# Patient Record
Sex: FEMALE | Race: BLACK OR AFRICAN AMERICAN | NOT HISPANIC OR LATINO | Employment: UNEMPLOYED | URBAN - METROPOLITAN AREA
[De-identification: names, ages, dates, MRNs, and addresses within clinical notes are randomized per-mention and may not be internally consistent; named-entity substitution may affect disease eponyms.]

---

## 2018-04-04 ENCOUNTER — TELEPHONE (OUTPATIENT)
Dept: ADMINISTRATIVE | Facility: HOSPITAL | Age: 83
End: 2018-04-04

## 2021-05-06 ENCOUNTER — PATIENT MESSAGE (OUTPATIENT)
Dept: RESEARCH | Facility: HOSPITAL | Age: 86
End: 2021-05-06

## 2021-07-01 ENCOUNTER — PATIENT MESSAGE (OUTPATIENT)
Dept: ADMINISTRATIVE | Facility: OTHER | Age: 86
End: 2021-07-01

## 2022-02-10 ENCOUNTER — PATIENT MESSAGE (OUTPATIENT)
Dept: ADMINISTRATIVE | Facility: HOSPITAL | Age: 87
End: 2022-02-10

## 2025-05-15 ENCOUNTER — HOSPITAL ENCOUNTER (INPATIENT)
Facility: HOSPITAL | Age: 89
LOS: 5 days | Discharge: HOME | DRG: 690 | End: 2025-05-21
Attending: EMERGENCY MEDICINE | Admitting: INTERNAL MEDICINE
Payer: MEDICARE

## 2025-05-15 DIAGNOSIS — N39.0 URINARY TRACT INFECTION WITHOUT HEMATURIA, SITE UNSPECIFIED: Primary | ICD-10-CM

## 2025-05-15 DIAGNOSIS — M81.0 AGE-RELATED OSTEOPOROSIS WITHOUT CURRENT PATHOLOGICAL FRACTURE: ICD-10-CM

## 2025-05-15 DIAGNOSIS — R41.82 AMS (ALTERED MENTAL STATUS): ICD-10-CM

## 2025-05-15 DIAGNOSIS — G20.B2 PARKINSON'S DISEASE WITH DYSKINESIA AND FLUCTUATING MANIFESTATIONS: ICD-10-CM

## 2025-05-15 DIAGNOSIS — R41.82 ALTERED MENTAL STATUS, UNSPECIFIED ALTERED MENTAL STATUS TYPE: ICD-10-CM

## 2025-05-15 DIAGNOSIS — R07.9 CHEST PAIN: ICD-10-CM

## 2025-05-15 LAB
ABSOLUTE EOSINOPHIL (OHS): 0.03 K/UL
ABSOLUTE MONOCYTE (OHS): 0.34 K/UL (ref 0.3–1)
ABSOLUTE NEUTROPHIL COUNT (OHS): 3.08 K/UL (ref 1.8–7.7)
ALBUMIN SERPL BCP-MCNC: 3.5 G/DL (ref 3.5–5.2)
ALP SERPL-CCNC: 66 UNIT/L (ref 40–150)
ALT SERPL W/O P-5'-P-CCNC: 15 UNIT/L (ref 10–44)
ANION GAP (OHS): 6 MMOL/L (ref 8–16)
AST SERPL-CCNC: 20 UNIT/L (ref 11–45)
BACTERIA #/AREA URNS AUTO: ABNORMAL /HPF
BASOPHILS # BLD AUTO: 0.01 K/UL
BASOPHILS NFR BLD AUTO: 0.2 %
BILIRUB SERPL-MCNC: 0.3 MG/DL (ref 0.1–1)
BILIRUB UR QL STRIP.AUTO: NEGATIVE
BNP SERPL-MCNC: 41 PG/ML (ref 0–99)
BUN SERPL-MCNC: 18 MG/DL (ref 8–23)
CALCIUM SERPL-MCNC: 10 MG/DL (ref 8.7–10.5)
CHLORIDE SERPL-SCNC: 105 MMOL/L (ref 95–110)
CLARITY UR: ABNORMAL
CO2 SERPL-SCNC: 32 MMOL/L (ref 23–29)
COLOR UR AUTO: YELLOW
CREAT SERPL-MCNC: 0.7 MG/DL (ref 0.5–1.4)
ERYTHROCYTE [DISTWIDTH] IN BLOOD BY AUTOMATED COUNT: 14.7 % (ref 11.5–14.5)
GFR SERPLBLD CREATININE-BSD FMLA CKD-EPI: >60 ML/MIN/1.73/M2
GLUCOSE SERPL-MCNC: 202 MG/DL (ref 70–110)
GLUCOSE UR QL STRIP: NEGATIVE
HCT VFR BLD AUTO: 36.8 % (ref 37–48.5)
HGB BLD-MCNC: 11.8 GM/DL (ref 12–16)
HGB UR QL STRIP: ABNORMAL
HOLD SPECIMEN: NORMAL
HYALINE CASTS UR QL AUTO: 0 /LPF (ref 0–1)
IMM GRANULOCYTES # BLD AUTO: 0.01 K/UL (ref 0–0.04)
IMM GRANULOCYTES NFR BLD AUTO: 0.2 % (ref 0–0.5)
KETONES UR QL STRIP: NEGATIVE
LEUKOCYTE ESTERASE UR QL STRIP: ABNORMAL
LYMPHOCYTES # BLD AUTO: 0.94 K/UL (ref 1–4.8)
MAGNESIUM SERPL-MCNC: 1.8 MG/DL (ref 1.6–2.6)
MCH RBC QN AUTO: 28.4 PG (ref 27–31)
MCHC RBC AUTO-ENTMCNC: 32.1 G/DL (ref 32–36)
MCV RBC AUTO: 89 FL (ref 82–98)
MICROSCOPIC COMMENT: ABNORMAL
NITRITE UR QL STRIP: NEGATIVE
NON-SQ EPI CELLS #/AREA URNS AUTO: 0 /HPF
NUCLEATED RBC (/100WBC) (OHS): 0 /100 WBC
OHS QRS DURATION: 90 MS
OHS QTC CALCULATION: 459 MS
PH UR STRIP: 6 [PH]
PHOSPHATE SERPL-MCNC: 3.4 MG/DL (ref 2.7–4.5)
PLATELET # BLD AUTO: 241 K/UL (ref 150–450)
PMV BLD AUTO: 10.8 FL (ref 9.2–12.9)
POCT GLUCOSE: 172 MG/DL (ref 70–110)
POCT GLUCOSE: 194 MG/DL (ref 70–110)
POTASSIUM SERPL-SCNC: 4.2 MMOL/L (ref 3.5–5.1)
PROT SERPL-MCNC: 7.5 GM/DL (ref 6–8.4)
PROT UR QL STRIP: ABNORMAL
RBC # BLD AUTO: 4.16 M/UL (ref 4–5.4)
RBC #/AREA URNS AUTO: 14 /HPF (ref 0–4)
RELATIVE EOSINOPHIL (OHS): 0.7 %
RELATIVE LYMPHOCYTE (OHS): 21.3 % (ref 18–48)
RELATIVE MONOCYTE (OHS): 7.7 % (ref 4–15)
RELATIVE NEUTROPHIL (OHS): 69.9 % (ref 38–73)
SODIUM SERPL-SCNC: 143 MMOL/L (ref 136–145)
SP GR UR STRIP: 1.02
SQUAMOUS #/AREA URNS AUTO: 0 /HPF
TROPONIN I SERPL DL<=0.01 NG/ML-MCNC: <0.006 NG/ML
TSH SERPL-ACNC: 2.12 UIU/ML (ref 0.4–4)
UROBILINOGEN UR STRIP-ACNC: NEGATIVE EU/DL
WBC # BLD AUTO: 4.41 K/UL (ref 3.9–12.7)
WBC #/AREA URNS AUTO: >100 /HPF (ref 0–5)

## 2025-05-15 PROCEDURE — 93010 ELECTROCARDIOGRAM REPORT: CPT | Mod: ,,, | Performed by: STUDENT IN AN ORGANIZED HEALTH CARE EDUCATION/TRAINING PROGRAM

## 2025-05-15 PROCEDURE — 83735 ASSAY OF MAGNESIUM: CPT

## 2025-05-15 PROCEDURE — 84100 ASSAY OF PHOSPHORUS: CPT

## 2025-05-15 PROCEDURE — 83880 ASSAY OF NATRIURETIC PEPTIDE: CPT

## 2025-05-15 PROCEDURE — 84443 ASSAY THYROID STIM HORMONE: CPT

## 2025-05-15 PROCEDURE — 87086 URINE CULTURE/COLONY COUNT: CPT

## 2025-05-15 PROCEDURE — 63600175 PHARM REV CODE 636 W HCPCS

## 2025-05-15 PROCEDURE — 99285 EMERGENCY DEPT VISIT HI MDM: CPT | Mod: 25

## 2025-05-15 PROCEDURE — G0378 HOSPITAL OBSERVATION PER HR: HCPCS

## 2025-05-15 PROCEDURE — 93005 ELECTROCARDIOGRAM TRACING: CPT

## 2025-05-15 PROCEDURE — 80053 COMPREHEN METABOLIC PANEL: CPT

## 2025-05-15 PROCEDURE — 85025 COMPLETE CBC W/AUTO DIFF WBC: CPT

## 2025-05-15 PROCEDURE — 81001 URINALYSIS AUTO W/SCOPE: CPT

## 2025-05-15 PROCEDURE — 96374 THER/PROPH/DIAG INJ IV PUSH: CPT

## 2025-05-15 PROCEDURE — 25000003 PHARM REV CODE 250

## 2025-05-15 PROCEDURE — 84484 ASSAY OF TROPONIN QUANT: CPT

## 2025-05-15 RX ORDER — INSULIN ASPART 100 [IU]/ML
0-5 INJECTION, SOLUTION INTRAVENOUS; SUBCUTANEOUS
Status: DISCONTINUED | OUTPATIENT
Start: 2025-05-15 | End: 2025-05-21 | Stop reason: HOSPADM

## 2025-05-15 RX ORDER — CEFTRIAXONE 1 G/1
1 INJECTION, POWDER, FOR SOLUTION INTRAMUSCULAR; INTRAVENOUS
Status: DISCONTINUED | OUTPATIENT
Start: 2025-05-16 | End: 2025-05-21 | Stop reason: HOSPADM

## 2025-05-15 RX ORDER — IBUPROFEN 200 MG
24 TABLET ORAL
Status: DISCONTINUED | OUTPATIENT
Start: 2025-05-15 | End: 2025-05-21 | Stop reason: HOSPADM

## 2025-05-15 RX ORDER — TALC
6 POWDER (GRAM) TOPICAL NIGHTLY PRN
Status: DISCONTINUED | OUTPATIENT
Start: 2025-05-15 | End: 2025-05-21 | Stop reason: HOSPADM

## 2025-05-15 RX ORDER — ENOXAPARIN SODIUM 100 MG/ML
40 INJECTION SUBCUTANEOUS EVERY 24 HOURS
Status: DISCONTINUED | OUTPATIENT
Start: 2025-05-15 | End: 2025-05-21 | Stop reason: HOSPADM

## 2025-05-15 RX ORDER — DONEPEZIL HYDROCHLORIDE 5 MG/1
5 TABLET, FILM COATED ORAL 2 TIMES DAILY
Status: ON HOLD | COMMUNITY
End: 2025-05-21 | Stop reason: HOSPADM

## 2025-05-15 RX ORDER — IBUPROFEN 200 MG
24 TABLET ORAL
Status: DISCONTINUED | OUTPATIENT
Start: 2025-05-15 | End: 2025-05-19

## 2025-05-15 RX ORDER — DONEPEZIL HYDROCHLORIDE 5 MG/1
5 TABLET, FILM COATED ORAL DAILY
Status: DISCONTINUED | OUTPATIENT
Start: 2025-05-16 | End: 2025-05-20

## 2025-05-15 RX ORDER — IBUPROFEN 200 MG
16 TABLET ORAL
Status: DISCONTINUED | OUTPATIENT
Start: 2025-05-15 | End: 2025-05-19

## 2025-05-15 RX ORDER — NALOXONE HCL 0.4 MG/ML
0.02 VIAL (ML) INJECTION
Status: DISCONTINUED | OUTPATIENT
Start: 2025-05-15 | End: 2025-05-15

## 2025-05-15 RX ORDER — INSULIN GLARGINE 100 [IU]/ML
10 INJECTION, SOLUTION SUBCUTANEOUS NIGHTLY
COMMUNITY
Start: 2025-03-28

## 2025-05-15 RX ORDER — LORAZEPAM 1 MG/1
1 TABLET ORAL 2 TIMES DAILY
COMMUNITY

## 2025-05-15 RX ORDER — AMLODIPINE BESYLATE 5 MG/1
5 TABLET ORAL NIGHTLY
COMMUNITY
Start: 2025-04-14

## 2025-05-15 RX ORDER — TALC
3 POWDER (GRAM) TOPICAL NIGHTLY
COMMUNITY
Start: 2024-07-31

## 2025-05-15 RX ORDER — CEFTRIAXONE 1 G/1
1 INJECTION, POWDER, FOR SOLUTION INTRAMUSCULAR; INTRAVENOUS
Status: COMPLETED | OUTPATIENT
Start: 2025-05-15 | End: 2025-05-15

## 2025-05-15 RX ORDER — ATORVASTATIN CALCIUM 20 MG/1
20 TABLET, FILM COATED ORAL NIGHTLY
Status: DISCONTINUED | OUTPATIENT
Start: 2025-05-15 | End: 2025-05-21 | Stop reason: HOSPADM

## 2025-05-15 RX ORDER — AMLODIPINE BESYLATE 5 MG/1
5 TABLET ORAL NIGHTLY
Status: DISCONTINUED | OUTPATIENT
Start: 2025-05-15 | End: 2025-05-21 | Stop reason: HOSPADM

## 2025-05-15 RX ORDER — PEN NEEDLE, DIABETIC, SAFETY 30 GX3/16"
NEEDLE, DISPOSABLE MISCELLANEOUS
COMMUNITY
Start: 2025-03-27

## 2025-05-15 RX ORDER — BREXPIPRAZOLE 2 MG/1
2 TABLET ORAL DAILY
Status: ON HOLD | COMMUNITY
Start: 2025-05-06 | End: 2025-05-21 | Stop reason: HOSPADM

## 2025-05-15 RX ORDER — SODIUM CHLORIDE 0.9 % (FLUSH) 0.9 %
10 SYRINGE (ML) INJECTION EVERY 12 HOURS PRN
Status: DISCONTINUED | OUTPATIENT
Start: 2025-05-15 | End: 2025-05-21 | Stop reason: HOSPADM

## 2025-05-15 RX ORDER — IBUPROFEN 200 MG
16 TABLET ORAL
Status: DISCONTINUED | OUTPATIENT
Start: 2025-05-15 | End: 2025-05-21 | Stop reason: HOSPADM

## 2025-05-15 RX ORDER — GLUCAGON 1 MG
1 KIT INJECTION
Status: DISCONTINUED | OUTPATIENT
Start: 2025-05-15 | End: 2025-05-21 | Stop reason: HOSPADM

## 2025-05-15 RX ORDER — GLUCAGON 1 MG
1 KIT INJECTION
Status: DISCONTINUED | OUTPATIENT
Start: 2025-05-15 | End: 2025-05-19

## 2025-05-15 RX ADMIN — CEFTRIAXONE SODIUM 1 G: 1 INJECTION, POWDER, FOR SOLUTION INTRAMUSCULAR; INTRAVENOUS at 02:05

## 2025-05-15 RX ADMIN — ATORVASTATIN CALCIUM 20 MG: 20 TABLET, FILM COATED ORAL at 08:05

## 2025-05-15 RX ADMIN — AMLODIPINE BESYLATE 5 MG: 5 TABLET ORAL at 08:05

## 2025-05-15 NOTE — ED NOTES
"Called Veronica Adams, [phone], patient's sister, and received a message saying, "Per the subscribers request, this phone is not receiving calls.    Called Alistair Mo, Patient's niece and [facility] listed on [employer], and no answer.  "

## 2025-05-15 NOTE — ED NOTES
Update given to yesenia Sainz, reports she will call [facility] to notify of Patient's Admit to Observation.

## 2025-05-15 NOTE — ED PROVIDER NOTES
Encounter Date: 05/15/2025       Chief Complaint   Altered Mental Status (BIB [facility] from [facility] for AMS. NH reports patient is less verbal than normal. Lower extremity edema noted by EMS. . Hx dementia. )      History Of Present Illness     Milagro Miller is a 89 y.o. female w/ a PMHx significant for HTN, hyperlipidemia, diabetes, dementia who presents to the ED from nursing facility via EMS for complaints of AMS.  Nursing facility reports that patient is less verbal than usual.  No complications EN route and POCT glucose 118 per EMS; EMS did note b/l LE edema - unclear if this is present at baseline and no documented echo or documented history of CHF.  Patient unable to provide additional history due to mental status.    Please see MDM for additional details.    Past History   As per HPI and below:  Past Medical History[1]  Past Surgical History[2]  Medications Ordered Prior to Encounter[3]    Social History[4]  family history includes No Known Problems in her father and mother.   Review of patient's allergies indicates:   Allergen Reactions    Mold extracts Other (See Comments)     cough    Ragweed Other (See Comments)     redness to eyes       Physical Exam     Vitals:    05/15/25 1403 05/15/25 1411 05/15/25 1436 05/15/25 1437   BP: 137/82      Pulse:  74  64   Resp: 17   17   Temp: 97.2 °F (36.2 °C)      TempSrc: Axillary      SpO2: 96%  100%    Weight:       Height:         Physical Exam  Vitals and nursing note reviewed.   Constitutional:       General: She is not in acute distress.     Appearance: Normal appearance. She is normal weight.   HENT:      Head: Normocephalic and atraumatic.      Nose: Nose normal.      Mouth/Throat:      Mouth: Mucous membranes are moist.      Pharynx: Oropharynx is clear.   Eyes:      Extraocular Movements: Extraocular movements intact.      Conjunctiva/sclera: Conjunctivae normal.      Pupils: Pupils are equal, round, and reactive to light.   Cardiovascular:       Rate and Rhythm: Bradycardia present.      Heart sounds: Normal heart sounds.   Pulmonary:      Effort: Pulmonary effort is normal. No respiratory distress.      Breath sounds: Normal breath sounds.   Abdominal:      General: There is no distension.      Palpations: Abdomen is soft.      Tenderness: There is no abdominal tenderness.   Musculoskeletal:         General: No tenderness. Normal range of motion.      Cervical back: Normal range of motion. No tenderness.      Right lower leg: Edema present.      Left lower leg: Edema present.   Skin:     General: Skin is warm and dry.      Findings: Erythema (b/l LE) present.   Neurological:      Mental Status: She is alert.      GCS: GCS eye subscore is 4. GCS verbal subscore is 1. GCS motor subscore is 5.            Medications Given     Medications   cefTRIAXone injection 1 g (1 g Intravenous Given 5/15/25 1440)       Labs & Imaging     Labs Reviewed   COMPREHENSIVE METABOLIC PANEL - Abnormal       Result Value    Sodium 143      Potassium 4.2      Chloride 105      CO2 32 (*)     Glucose 202 (*)     BUN 18      Creatinine 0.7      Calcium 10.0      Protein Total 7.5      Albumin 3.5      Bilirubin Total 0.3      ALP 66      AST 20      ALT 15      Anion Gap 6 (*)     eGFR >60     URINALYSIS, REFLEX TO URINE CULTURE - Abnormal    Color, UA Yellow      Appearance, UA Cloudy (*)     pH, UA 6.0      Spec Grav UA 1.020      Protein, UA 1+ (*)     Glucose, UA Negative      Ketones, UA Negative      Bilirubin, UA Negative      Blood, UA 2+ (*)     Nitrites, UA Negative      Urobilinogen, UA Negative      Leukocyte Esterase, UA 3+ (*)    CBC WITH DIFFERENTIAL - Abnormal    WBC 4.41      RBC 4.16      HGB 11.8 (*)     HCT 36.8 (*)     MCV 89      MCH 28.4      MCHC 32.1      RDW 14.7 (*)     Platelet Count 241      MPV 10.8      Nucleated RBC 0      Neut % 69.9      Lymph % 21.3      Mono % 7.7      Eos % 0.7      Basophil % 0.2      Imm Grans % 0.2      Neut # 3.08      Lymph  # 0.94 (*)     Mono # 0.34      Eos # 0.03      Baso # 0.01      Imm Grans # 0.01     URINALYSIS MICROSCOPIC - Abnormal    RBC, UA 14 (*)     WBC, UA >100 (*)     Bacteria, UA Occasional      Squamous Epithelial Cells, UA 0      Non-Squamous Epithelial Cells 0      Hyaline Casts, UA 0      Microscopic Comment       MAGNESIUM - Normal    Magnesium  1.8     PHOSPHORUS - Normal    Phosphorus Level 3.4     TSH - Normal    TSH 2.123     B-TYPE NATRIURETIC PEPTIDE - Normal    BNP 41     TROPONIN I - Normal    Troponin-I <0.006     CULTURE, URINE   CBC W/ AUTO DIFFERENTIAL    Narrative:     The following orders were created for panel order CBC auto differential.                  Procedure                               Abnormality         Status                                     ---------                               -----------         ------                                     CBC with Differential[[id number]]       Abnormal            Final result                                                 Please view results for these tests on the individual orders.   GREY TOP URINE HOLD    Extra Tube Hold for add-ons.         Imaging Results              CT Head Without Contrast (Final result)  Result time 05/15/25 11:06:29      Final result by Chalino Martinez MD (05/15/25 11:06:29)                   Impression:      1. No evidence of acute intracranial hemorrhage, mass effect, or midline shift.  2. 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  MRI may be considered for more sensitive and specific assessment, if there is concern for recent ischemia referable to these locations.      Electronically signed by: Chalino Martinez  Date:    05/15/2025  Time:    11:06               Narrative:    EXAMINATION:  CT HEAD WITHOUT CONTRAST    CLINICAL HISTORY:  Mental status change, unknown cause;    TECHNIQUE:  Low dose axial images were  obtained through the head.  Coronal and sagittal reformations were also performed. Contrast was not administered.    COMPARISON:  None.    FINDINGS:  Blood: No acute intracranial hemorrhage.    Parenchyma: No definite loss of gray-white differentiation to suggest acute or subacute transcortical infarct. Parenchymal volume loss is noted with notable atrophy of the left greater than right mesial temporal lobe structures.  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  Additional nonspecific areas of white matter hypoattenuation may reflect sequela of chronic small vessel ischemic disease.    Ventricles/Extra-axial spaces: No abnormal extra-axial fluid collection. Basal cisterns are patent.    Vessels: Moderate atherosclerotic calcification.    Orbits: Unremarkable.    Scalp: Unremarkable.    Skull: There are no depressed skull fractures or destructive bone lesions.    Sinuses and mastoids: Polypoid mucosal thickening and retention cysts noted in the paranasal sinus.  Chronic appearing essentially complete opacification of the right mastoid air cells.  Cortical defect about the lateral right mastoid temporal bone, possibly postsurgical in etiology.  Partial opacification of the right middle ear cavity.  Minimal dependent opacification of the left mastoid air cells.    Other findings: None                                       X-Ray Chest AP Portable (Final result)  Result time 05/15/25 12:06:04      Final result by Bhupendra Schultz MD (05/15/25 12:06:04)                   Impression:      Bibasilar subsegmental atelectasis.  No focal consolidation.    Borderline cardiomegaly.  No evidence of failure.      Electronically signed by: Bhupendra Schultz MD  Date:    05/15/2025  Time:    12:06               Narrative:    EXAMINATION:  XR CHEST AP PORTABLE    CLINICAL HISTORY:  Altered mental status, unspecified    TECHNIQUE:  Single frontal  view of the chest was performed.    COMPARISON:  None    FINDINGS:  Portions of the lung apices are obscured by the patient's chin.  There are calcifications of the trachea.  The cardiomediastinal silhouette is borderline enlarged.  There are no pleural effusions.  There is no evidence of a pneumothorax.  There is no evidence of pneumomediastinum.  There are bibasilar subsegmental atelectasis.  There is no focal consolidation.    There is no evidence of free air beneath the hemidiaphragms.  There are degenerative changes in the osseous structures.                                      HARVINDER Miller is a 89 y.o. female who presents to the emergency department for concerns of altered mental status as detailed in HPI.  Patient as a blood pressure 114/57 and is slightly bradycardic w/ a heart rate of 50. She is afebrile. On exam, patient w/ GCS of 10(4,1,5). No evidence of trauma.  No apparent abdominal tenderness to palpation.  Patient does have lower extremity edema w/mild erythema, but equal bilaterally; does not appear cellulitic. Otherwise, physical exam largely unremarkable.  Ceftriaxone given for UTI. On re-evaluation, patient has no significant change in physical exam, VS, or overall clinical status.     Labs:  No leukocytosis, mild anemia.  Urinalysis significantly concerning for UTI, especially in the setting of AMS.  CMP shows no gross metabolic derangements or DARYL.  TSH WNL.    Imaging:  CXR shows atelectasis w/ no focal consolidation, reducing concern for pneumonia.  CT head shows no acute intracranial pathology, does demonstrate likely remote infarcts.     Ddx including, but not limited to: Underlying neurocognitive disorder v. UTI v. PNA v. electrolyte imbalance v. CVA v. ICH v. dehydration v. MI v. CHF      Most likely Dx:  I have highest suspicion for UTI given patient's history, physical exam, labs.    Disposition: Patient will be admitted. Discussed reason for admission and plan with patient  "and/or caregiver who expressed understanding and agreement.    Please see HPI, physical exam, ED course for additional details.    Procedures       ED Disposition Condition    Observation             Raman Peace MD         [1]   Past Medical History:  Diagnosis Date    Diabetes mellitus     Hyperlipidemia    [2]   Past Surgical History:  Procedure Laterality Date    HYSTERECTOMY     [3]   No current facility-administered medications on file prior to encounter.     Current Outpatient Medications on File Prior to Encounter   Medication Sig Dispense Refill    alendronate (FOSAMAX) 70 MG tablet TAKE ONE TABLET BY MOUTH EVERY 7 DAYS (Patient taking differently: Take 70 mg by mouth every Sunday. 30MIN BEFORE FOOD W/8OZ WATER,STAY UPRIGHT FOR 30MIN) 4 tablet 11    amLODIPine (NORVASC) 5 MG tablet Take 5 mg by mouth nightly.      donepeziL (ARICEPT) 5 MG tablet Take 5 mg by mouth 2 (two) times a day.      LANTUS SOLOSTAR U-100 INSULIN 100 unit/mL (3 mL) InPn pen Inject 10 Units into the skin every evening.      LORazepam (ATIVAN) 1 MG tablet Take 1 mg by mouth 2 (two) times daily.      melatonin (MELATIN) 3 mg tablet Take 3 mg by mouth nightly.      REXULTI 2 mg Tab Take 2 mg by mouth once daily.      simvastatin (ZOCOR) 20 MG tablet TAKE ONE TABLET BY MOUTH EVERY EVENING (Patient taking differently: Take 20 mg by mouth every evening.) 30 tablet 11    BD AUTOSHIELD DUO PEN NEEDLE 30 gauge x 3/16" Ndle       metFORMIN (GLUCOPHAGE) 500 MG tablet Take 2 tablets (1,000 mg total) by mouth 2 (two) times daily with meals. (Patient not taking: Reported on 5/15/2025) 360 tablet 3    [DISCONTINUED] meloxicam (MOBIC) 15 MG tablet Take 1 tablet (15 mg total) by mouth daily as needed for Pain. 30 tablet 3    [DISCONTINUED] quinapril (ACCUPRIL) 40 MG tablet TAKE ONE TABLET BY MOUTH EVERY EVENING 30 tablet 11   [4]   Social History  Tobacco Use    Smoking status: Former     Current packs/day: 0.05     Average packs/day: 0.1 packs/day " for 15.0 years (0.8 ttl pk-yrs)     Types: Cigarettes    Smokeless tobacco: Former   Substance Use Topics    Alcohol use: No    Drug use: No        Raman Peace MD  Resident  05/15/25 0719

## 2025-05-15 NOTE — NURSING
"PROACTIVE ROUNDING NURSE AI ALERT       AI alert received.    Chart Reviewed: 05/15/2025, 6:58 PM    MRN: [id number]  Bed: K527/K527 A    Dx: Urinary tract infection without hematuria    Milagro Miller has a past medical history of Diabetes mellitus and Hyperlipidemia.    Last VS: BP (!) 142/65 (BP Location: Right arm, Patient Position: Lying)   Pulse 61   Temp 97.3 °F (36.3 °C) (Tympanic)   Resp 18   Ht 5' 6" (1.676 m)   Wt 68 kg (150 lb)   SpO2 100%   BMI 24.21 kg/m²     24H Vital Sign Range:  Temp:  [97.2 °F (36.2 °C)-97.3 °F (36.3 °C)]   Pulse:  [50-74]   Resp:  [10-18]   BP: (114-142)/(57-82)   SpO2:  [96 %-100 %]     Level of Consciousness (AVPU): responds to voice    Recent Labs     05/15/25  1105   WBC 4.41   HGB 11.8*   HCT 36.8*          Recent Labs     05/15/25  1105      K 4.2      CO2 32*   BUN 18   CREATININE 0.7   *   PHOS 3.4   MG 1.8           MEWS score: 2    Chart review completed including VS, notes, orders, and labs. VS stable upon arrival to floor. Discussed in handoff with on-coming rapid response RN. Please notify rapid response RN with any concerns or acute changes in patient's condition.       "

## 2025-05-15 NOTE — PHARMACY MED REC
"  [facility]           Pharmacy  Admission Medication History     The home medication history was taken by Margo Ac.      Medication history obtained from Medications listed below were obtained from: Nursing home    Based on information gathered for medication list, you may go to "Admission" then "Reconcile Home Medications" tabs to review and/or act upon those items.     The home medication list has been updated by the Pharmacy department.   Please read ALL comments highlighted in yellow.   Please address this information as you see fit.    Feel free to contact us if you have any questions or require assistance.    The medications listed below were removed from the home medication list.  Please reorder if appropriate:    Patient reports NOT TAKING the following medication(s):  Accupril 40mg  Mobic 15mg      No current facility-administered medications on file prior to encounter.     Current Outpatient Medications on File Prior to Encounter   Medication Sig Dispense Refill    alendronate (FOSAMAX) 70 MG tablet TAKE ONE TABLET BY MOUTH EVERY 7 DAYS (Patient taking differently: Take 70 mg by mouth every Sunday. 30MIN BEFORE FOOD W/8OZ WATER,STAY UPRIGHT FOR 30MIN) 4 tablet 11    amLODIPine (NORVASC) 5 MG tablet Take 5 mg by mouth nightly.      donepeziL (ARICEPT) 5 MG tablet Take 5 mg by mouth 2 (two) times a day.      LANTUS SOLOSTAR U-100 INSULIN 100 unit/mL (3 mL) InPn pen Inject 10 Units into the skin every evening.      LORazepam (ATIVAN) 1 MG tablet Take 1 mg by mouth 2 (two) times daily.      melatonin (MELATIN) 3 mg tablet Take 3 mg by mouth nightly.      REXULTI 2 mg Tab Take 2 mg by mouth once daily.      simvastatin (ZOCOR) 20 MG tablet TAKE ONE TABLET BY MOUTH EVERY EVENING (Patient taking differently: Take 20 mg by mouth every evening.) 30 tablet 11    BD AUTOSHIELD DUO PEN NEEDLE 30 gauge x 3/16" Ndle          Please address this information as you see fit.  Feel free to " contact us if you have any questions or require assistance.    Margo Ac  [phone]                .

## 2025-05-16 LAB
ABSOLUTE EOSINOPHIL (OHS): 0.03 K/UL
ABSOLUTE MONOCYTE (OHS): 0.29 K/UL (ref 0.3–1)
ABSOLUTE NEUTROPHIL COUNT (OHS): 1.79 K/UL (ref 1.8–7.7)
ALBUMIN SERPL BCP-MCNC: 3.4 G/DL (ref 3.5–5.2)
ALP SERPL-CCNC: 60 UNIT/L (ref 40–150)
ALT SERPL W/O P-5'-P-CCNC: 16 UNIT/L (ref 10–44)
ANION GAP (OHS): 12 MMOL/L (ref 8–16)
AST SERPL-CCNC: 23 UNIT/L (ref 11–45)
BASOPHILS # BLD AUTO: 0.01 K/UL
BASOPHILS NFR BLD AUTO: 0.4 %
BILIRUB SERPL-MCNC: 0.3 MG/DL (ref 0.1–1)
BUN SERPL-MCNC: 14 MG/DL (ref 8–23)
CALCIUM SERPL-MCNC: 10 MG/DL (ref 8.7–10.5)
CHLORIDE SERPL-SCNC: 105 MMOL/L (ref 95–110)
CHOLEST SERPL-MCNC: 171 MG/DL (ref 120–199)
CHOLEST/HDLC SERPL: 2.1 {RATIO} (ref 2–5)
CO2 SERPL-SCNC: 26 MMOL/L (ref 23–29)
CREAT SERPL-MCNC: 0.7 MG/DL (ref 0.5–1.4)
EAG (OHS): 209 MG/DL (ref 68–131)
ERYTHROCYTE [DISTWIDTH] IN BLOOD BY AUTOMATED COUNT: 14.7 % (ref 11.5–14.5)
GFR SERPLBLD CREATININE-BSD FMLA CKD-EPI: >60 ML/MIN/1.73/M2
GLUCOSE SERPL-MCNC: 143 MG/DL (ref 70–110)
HBA1C MFR BLD: 8.9 % (ref 4–5.6)
HCT VFR BLD AUTO: 34.8 % (ref 37–48.5)
HDLC SERPL-MCNC: 81 MG/DL (ref 40–75)
HDLC SERPL: 47.4 % (ref 20–50)
HGB BLD-MCNC: 11.2 GM/DL (ref 12–16)
IMM GRANULOCYTES # BLD AUTO: 0 K/UL (ref 0–0.04)
IMM GRANULOCYTES NFR BLD AUTO: 0 % (ref 0–0.5)
LDLC SERPL CALC-MCNC: 76.6 MG/DL (ref 63–159)
LYMPHOCYTES # BLD AUTO: 0.65 K/UL (ref 1–4.8)
MAGNESIUM SERPL-MCNC: 1.7 MG/DL (ref 1.6–2.6)
MCH RBC QN AUTO: 28 PG (ref 27–31)
MCHC RBC AUTO-ENTMCNC: 32.2 G/DL (ref 32–36)
MCV RBC AUTO: 87 FL (ref 82–98)
NONHDLC SERPL-MCNC: 90 MG/DL
NUCLEATED RBC (/100WBC) (OHS): 0 /100 WBC
PLATELET # BLD AUTO: 266 K/UL (ref 150–450)
PMV BLD AUTO: 11.4 FL (ref 9.2–12.9)
POCT GLUCOSE: 120 MG/DL (ref 70–110)
POCT GLUCOSE: 191 MG/DL (ref 70–110)
POCT GLUCOSE: 221 MG/DL (ref 70–110)
POCT GLUCOSE: 225 MG/DL (ref 70–110)
POTASSIUM SERPL-SCNC: 4.1 MMOL/L (ref 3.5–5.1)
PROT SERPL-MCNC: 7.2 GM/DL (ref 6–8.4)
RBC # BLD AUTO: 4 M/UL (ref 4–5.4)
RELATIVE EOSINOPHIL (OHS): 1.1 %
RELATIVE LYMPHOCYTE (OHS): 23.5 % (ref 18–48)
RELATIVE MONOCYTE (OHS): 10.5 % (ref 4–15)
RELATIVE NEUTROPHIL (OHS): 64.5 % (ref 38–73)
SODIUM SERPL-SCNC: 143 MMOL/L (ref 136–145)
TRIGL SERPL-MCNC: 67 MG/DL (ref 30–150)
WBC # BLD AUTO: 2.77 K/UL (ref 3.9–12.7)

## 2025-05-16 PROCEDURE — 36415 COLL VENOUS BLD VENIPUNCTURE: CPT

## 2025-05-16 PROCEDURE — 25000003 PHARM REV CODE 250

## 2025-05-16 PROCEDURE — 63600175 PHARM REV CODE 636 W HCPCS

## 2025-05-16 PROCEDURE — 83036 HEMOGLOBIN GLYCOSYLATED A1C: CPT

## 2025-05-16 PROCEDURE — 11000001 HC ACUTE MED/SURG PRIVATE ROOM

## 2025-05-16 PROCEDURE — 92610 EVALUATE SWALLOWING FUNCTION: CPT

## 2025-05-16 PROCEDURE — 97161 PT EVAL LOW COMPLEX 20 MIN: CPT

## 2025-05-16 PROCEDURE — 80061 LIPID PANEL: CPT

## 2025-05-16 PROCEDURE — 82247 BILIRUBIN TOTAL: CPT

## 2025-05-16 PROCEDURE — 85025 COMPLETE CBC W/AUTO DIFF WBC: CPT

## 2025-05-16 PROCEDURE — 83735 ASSAY OF MAGNESIUM: CPT

## 2025-05-16 RX ADMIN — CEFTRIAXONE SODIUM 1 G: 1 INJECTION, POWDER, FOR SOLUTION INTRAMUSCULAR; INTRAVENOUS at 03:05

## 2025-05-16 RX ADMIN — ATORVASTATIN CALCIUM 20 MG: 20 TABLET, FILM COATED ORAL at 09:05

## 2025-05-16 RX ADMIN — INSULIN ASPART 1 UNITS: 100 INJECTION, SOLUTION INTRAVENOUS; SUBCUTANEOUS at 09:05

## 2025-05-16 RX ADMIN — ENOXAPARIN SODIUM 40 MG: 40 INJECTION SUBCUTANEOUS at 05:05

## 2025-05-16 RX ADMIN — DONEPEZIL HYDROCHLORIDE 5 MG: 5 TABLET ORAL at 09:05

## 2025-05-16 RX ADMIN — INSULIN ASPART 2 UNITS: 100 INJECTION, SOLUTION INTRAVENOUS; SUBCUTANEOUS at 05:05

## 2025-05-16 RX ADMIN — AMLODIPINE BESYLATE 5 MG: 5 TABLET ORAL at 09:05

## 2025-05-16 NOTE — PT/OT/SLP EVAL
Occupational Therapy   Evaluation and Discharge Note    Name: Milagro Miller  MRN: [id number]  Admitting Diagnosis: Urinary tract infection without hematuria  Recent Surgery: * No surgery found *      Recommendations:     Discharge Recommendations: No Therapy Indicated  Discharge Equipment Recommendations: none  Barriers to discharge:  None    Assessment:     Milagro Miller is a 89 y.o. female with a medical diagnosis of Urinary tract infection without hematuria. At this time, patient is functioning at their prior level of function and does not require further acute OT services.     Plan:     During this hospitalization, patient does not require further acute OT services.  Please re-consult if situation changes.    Plan of Care Reviewed with: patient    Subjective     Chief Complaint: Patient disoriented and w/ poor command following.   Patient/Family Comments/goals: Return to NH.     Occupational Profile:  Living Environment: Per chart review, patient is alf NH resident.   Previous level of function: Assist w/ all ADLs and functional mobility. Pt is primarily bed bound.   Equipment Used at home:  (Patient is NH resident)  Assistance upon Discharge: NH staff     Pain/Comfort:  Pain Rating 1: 0/10  Pain Rating 2: 0/10    Patients cultural, spiritual, Buddhist conflicts given the current situation: no    Objective:     Communicated with: nsg prior to session.  Patient found right sidelying with bed alarm, telemetry, PureWick, peripheral IV, PRAFO upon OT entry to room.    General Precautions: Standard, fall, other (see comments) (minced/moist diet)  Orthopedic Precautions: N/A  Braces: N/A  Respiratory Status: Room air     Occupational Performance:    Bed Mobility:    Patient completed Scooting/Bridging with total assistance and 2 persons    Activities of Daily Living:  Feeding:  maximal assistance and total assistance to feed herself. OT scoops food onto spoon, and places into patient's hand. Pt able to  bring food to her mouth w/ verbal cues and increased effort x1 attempt. Pt noted to undershoot mouth, requiring [place] to guide spoon to her mouth.     Cognitive/Visual Perceptual:  Cognitive/Psychosocial Skills:     -       Oriented to: Person   -       Follows Commands/attention: unable to follow commands   -       Communication: mumbled, incoherent responses.  -       Safety awareness/insight to disability: impaired       AMPAC 6 Click ADL:  AMPAC Total Score: 6    Treatment & Education:  Pt educated on purpose/role of OT.   Patient is at her current functional baseline for ADLs and functional mobility.    History limited as patient is unable to provide history due to mental status.   Per chart review, patient is a halfway NH resident and primarily bed bound.   Pt w/ poor communication and ability to follow commands.   Patient requires total x2 for bed mobility and total A x1 for feeding.   Recommend patient return to NH and receive 24/7 care.   Discharge OT services; re-consult if status changes.        Patient left supine with all lines intact, call button in reach, bed alarm on, and nsg notified    GOALS:   Multidisciplinary Problems       Occupational Therapy Goals          Problem: Occupational Therapy    Goal Priority Disciplines Outcome Interventions   Occupational Therapy Goal     OT, PT/OT Adequate for Care Transition                          History:     Past Medical History:   Diagnosis Date    Diabetes mellitus     Hyperlipidemia          Past Surgical History:   Procedure Laterality Date    HYSTERECTOMY         Time Tracking:     OT Date of Treatment: 05/16/25  OT Start Time: 1147  OT Stop Time: 1158  OT Total Time (min): 11 min    Billable Minutes:Evaluation 11    5/16/2025

## 2025-05-16 NOTE — PLAN OF CARE
The pt is a retirement resident at [facility]. The pt has dementia,so the sw spoke to her niece Merna Mo [phone] via phone to complete the assessment. She states the pt has been a resident at the nh for almost 3 years and will return after d/c. The pt has never been  and doesn't have any children. The sw left her name and contact info with Merna and encouraged her to call if she has any further questions or concerns. The sw will continue to follow th ept throughout her transitions of care and will assist with any d/c needs. The staff assist the pt will all her ADL's and she's usually w/c bound.     [facility] - Med Surg  Initial Discharge Assessment       Primary Care Provider: Brissa Mcduffie NP    Admission Diagnosis: Chest pain [R07.9]  Urinary tract infection without hematuria, site unspecified [N39.0]  AMS (altered mental status) [R41.82]    Admission Date: 5/15/2025  Expected Discharge Date:     Transition of Care Barriers: (P) None    Payor: [employer] [facility] / Plan: [employer] CHOICES / Product Type: Medicare Advantage /     Extended Emergency Contact Information  Primary Emergency Contact: Merna Mo  Mobile Phone: [phone]  Relation: Other  Preferred language: English   needed? No  Secondary Emergency Contact: Rachna Adams   [place]  Work Phone: [phone]  Mobile Phone: [phone]  Relation: Sister    Discharge Plan A: (P) Return to nursing home  Discharge Plan B: (P) Other (TBD)      [facility] [place] [facility] [address] [facility] [address] [address] [address] [address]  Phone: [phone] Fax: [phone]      Initial Assessment (most recent)       Adult Discharge Assessment - 05/16/25 1649          Discharge Assessment    Assessment Type Discharge Planning Assessment (P)      Confirmed/corrected address, phone number and insurance Yes (P)      Confirmed Demographics Correct on Facesheet (P)      Source of  Information family (P)      Communicated NEERU with patient/caregiver Yes (P)      Reason For Admission UTI without hematuria (P)      [facility] facility resident (P)      Facility Arrived From: [facility] (P)      Do you expect to return to your current living situation? Yes (P)      Do you have help at home or someone to help you manage your care at home? Yes (P)      Who are your caregiver(s) and their phone number(s)? the staff at the nh (P)      Prior to hospitilization cognitive status: Not Oriented to Person;Not Oriented to Place;Not Oriented to Time (P)      Current cognitive status: Not Oriented to Person;Not Oriented to Place;Not Oriented to Time (P)      Walking or Climbing Stairs Difficulty yes (P)      Walking or Climbing Stairs ambulation difficulty, dependent;stair climbing difficulty, dependent;transferring difficulty, dependent (P)      Dressing/Bathing Difficulty yes (P)      Dressing/Bathing bathing difficulty, dependent;dressing difficulty, dependent (P)      Equipment Currently Used at Home other (see comments) (P)    the nh's dme    Readmission within 30 days? No (P)      Patient currently being followed by outpatient case management? No (P)      Do you currently have service(s) that help you manage your care at home? No (P)      Do you take prescription medications? Yes (P)      Do you have prescription coverage? Yes (P)      Do you have any problems affording any of your prescribed medications? No (P)      Is the patient taking medications as prescribed? yes (P)      Who is going to help you get home at discharge? the pt will need transportation back to the nh (P)      How do you get to doctors appointments? other (see comments) (P)    nh arranges    Are you on dialysis? No (P)      Do you take coumadin? No (P)      Discharge Plan A Return to nursing home (P)      Discharge Plan B Other (P)    TBD    DME Needed Upon Discharge  none (P)      Discharge Plan discussed  with: -- (P)    Merna Mo([facility][phone]    Transition of Care Barriers None (P)         Physical Activity    On average, how many days per week do you engage in moderate to strenuous exercise (like a brisk walk)? Patient unable to answer (P)      On average, how many minutes do you engage in exercise at this level? Patient unable to answer (P)         Financial Resource Strain    How hard is it for you to pay for the very basics like food, housing, medical care, and heating? Patient unable to answer (P)         Housing Stability    In the last 12 months, was there a time when you were not able to pay the mortgage or rent on time? Patient unable to answer (P)      At any time in the past 12 months, were you homeless or living in a shelter (including now)? Patient unable to answer (P)         Transportation Needs    In the past 12 months, has lack of transportation kept you from medical appointments or from getting medications? Patient unable to answer (P)      In the past 12 months, has lack of transportation kept you from meetings, work, or from getting things needed for daily living? Patient unable to answer (P)         Food Insecurity    Within the past 12 months, you worried that your food would run out before you got the money to buy more. Patient unable to answer (P)      Within the past 12 months, the food you bought just didn't last and you didn't have money to get more. Patient unable to answer (P)         Stress    Do you feel stress - tense, restless, nervous, or anxious, or unable to sleep at night because your mind is troubled all the time - these days? Patient unable to answer (P)         Social Isolation    How often do you feel lonely or isolated from those around you?  Patient unable to answer (P)         Alcohol Use    Q1: How often do you have a drink containing alcohol? Patient unable to answer (P)      Q2: How many drinks containing alcohol do you have on a typical day when you are drinking?  Patient unable to answer (P)      Q3: How often do you have six or more drinks on one occasion? Patient unable to answer (P)         Utilities    In the past 12 months has the electric, gas, oil, or water company threatened to shut off services in your home? Patient unable to answer (P)         Health Literacy    How often do you need to have someone help you when you read instructions, pamphlets, or other written material from your doctor or pharmacy? Patient unable to respond (P)         OTHER    Name(s) of People in Home nh resident (P)

## 2025-05-16 NOTE — NURSING
05/16/25 0730   Admission Complete   Admission Complete by VN Complete     Patient with history of dementia and presentation of encephalopathy. Admission questions complete via chart review and paperwork sent from nursing home. Patient's chart, labs and vital signs reviewed. Careplan initiated.

## 2025-05-16 NOTE — CARE UPDATE
Inpatient Upgrade Note    Milagro Miller has warranted treatment spanning two or more midnights of hospital level care for the management of altered mental status. She continues to require IV antibiotics, daily labs, and medication adjustments. Her condition is also complicated by the following comorbidities: HTN, dementia, HLD, T2DM.

## 2025-05-16 NOTE — NURSING
Rapid Response Nurse Follow-up Note     Followed up with patient for proactive rounding. Pt alert in bed  Notified by charge RN pt with rectal temp 91 F. Bairhugger brought to bedside placed on pt set 38 C.  Reviewed plan of care with Bedside nurse, Chelsey.   Team will continue to follow.  Please call Rapid Response RN, Sera Ndiaye RN with any questions or concerns at N Phone [phone].

## 2025-05-16 NOTE — H&P
[facility] Medicine H&P Note     Admitting Team: [facility] Hospitalist Team B  Attending Physician: Germán Horowitz MD  Resident: MD Julia  Intern: MD Lio    Date of Admit: 5/15/2025    Chief Complaint     Acute Encephalopathy    Subjective:      History of Present Illness:  Milagro Miller is a 89 y.o. female with a PMHx of HTN, dementia, HLD, T2DM. The patient presented on 5/15/2025 for evaluation of acute encephalopathy.    History limited as patient unable to provide history due to mental status. Patient's family called to obtain additional history.    Patient lives at [facility]. Nursing home called EMS today because patient was less verbal and responsive than usual. Per family, this is her baseline but less cooperative than usual. EMS noted lower extremity edema, blood glucose within normal limits. Patient has a history of dementia.     Past Medical History:  Dementia  HTN  HLD  T2DM    Past Surgical History:  Past Surgical History:   Procedure Laterality Date    HYSTERECTOMY         Social History:  Tobacco: Unable to obtain due to mental status   Alcohol: Unable to obtain due to mental status   Drugs: Unable to obtain due to mental status     Family History:  Family History   Problem Relation Name Age of Onset    No Known Problems Mother      No Known Problems Father         Home Medications:  Alendronate 70mg weekly  Amlodipine 5mg nightly  Donepezil 5mg BID  Lantus 10u nightly  Lorazepam 1mg BID  Melatonin 3mg nightly  Rexulti 2mg daily  Simvastatin 20mg daily    Allergies:  Review of patient's allergies indicates:   Allergen Reactions    Mold extracts Other (See Comments)     cough    Ragweed Other (See Comments)     redness to eyes       Review of Systems:  Unable to obtain due to mental status    Health Care Maintenance :   Primary Care Physician: Brissa Mcduffie NP     Immunizations:   Immunization History   Administered Date(s) Administered    COVID-19, MRNA, LN-S, PF (MODERNA FULL 0.5 ML DOSE)  "2025    Influenza - Trivalent - Fluzone High Dose - PF (65 years and older) 2016, 2017, 10/03/2018    Pneumococcal Conjugate - 13 Valent 2015    Pneumococcal Polysaccharide - 23 Valent 2016    Tdap 2016        Cancer Screening:  PAP: age out  MMG: age out  Colonoscopy: age out    Objective:   Last 24 Hour Vital Signs:  BP  Min: 114/57  Max: 162/83  Temp  Av.2 °F (35.1 °C)  Min: 91 °F (32.8 °C)  Max: 97.3 °F (36.3 °C)  Pulse  Av.3  Min: 50  Max: 74  Resp  Avg: 15.3  Min: 10  Max: 18  SpO2  Av.3 %  Min: 96 %  Max: 100 %  Height  Av' 6" (167.6 cm)  Min: 5' 6" (167.6 cm)  Max: 5' 6" (167.6 cm)  Weight  Av.1 kg (154 lb 9.4 oz)  Min: 68 kg (150 lb)  Max: 72.2 kg (159 lb 2.8 oz)  Body mass index is 25.69 kg/m².  I/O last 3 completed shifts:  In: 60 [P.O.:60]  Out: -     Physical Examination:  Physical Exam  Constitutional:       General: She is not in acute distress.     Comments: Laying in bed on her side, gazing into the distance, occasionally muttering noises  Not following commands   HENT:      Head: Normocephalic and atraumatic.   Eyes:      Extraocular Movements: Extraocular movements intact.      Conjunctiva/sclera: Conjunctivae normal.   Cardiovascular:      Rate and Rhythm: Normal rate and regular rhythm.   Pulmonary:      Effort: Pulmonary effort is normal. No respiratory distress.      Breath sounds: Normal breath sounds.   Abdominal:      General: Abdomen is flat.      Palpations: Abdomen is soft.   Musculoskeletal:      Comments: 2+ BLE edema up to mid shin  Right hand tremor   Skin:     Capillary Refill: Capillary refill takes less than 2 seconds.   Neurological:      Mental Status: She is disoriented.      Comments: A+O x0, not following commands. Eyes follow voice          Laboratory:  Most Recent Data:  CBC:   Lab Results   Component Value Date    WBC 4.41 05/15/2025    HGB 11.8 (L) 05/15/2025    HCT 36.8 (L) 05/15/2025     05/15/2025    " MCV 89 05/15/2025    RDW 14.7 (H) 05/15/2025     BMP:   Lab Results   Component Value Date     05/15/2025    K 4.2 05/15/2025     05/15/2025    CO2 32 (H) 05/15/2025    BUN 18 05/15/2025    CREATININE 0.7 05/15/2025     (H) 05/15/2025    CALCIUM 10.0 05/15/2025    MG 1.8 05/15/2025    PHOS 3.4 05/15/2025     LFTs:   Lab Results   Component Value Date    PROT 7.5 05/15/2025    ALBUMIN 3.5 05/15/2025    BILITOT 0.3 05/15/2025    AST 20 05/15/2025    ALKPHOS 66 05/15/2025    ALT 15 05/15/2025     Thyroid:   Lab Results   Component Value Date    TSH 2.123 05/15/2025     Cardiac:   Lab Results   Component Value Date    TROPONINI <0.006 05/15/2025    BNP 41 05/15/2025     Urinalysis:   Lab Results   Component Value Date    COLORU Yellow 05/15/2025    SPECGRAV 1.020 05/15/2025    NITRITE Negative 05/15/2025    UROBILINOGEN Negative 05/15/2025    WBCUA >100 (H) 05/15/2025       Microbiology Data:   Urine culture in process  UA 3+ LE, >100 WBC, occasional bacteria, 2+ blood    Radiology:  Chest XR: bibasilar atelectasis, no focal consolidation. Borderline cardiomegaly.    CT head without contrast: no acute intracranial hemorrhage, remote small infarcts    Other Results:  EKG (my interpretation): Atrial fibrillation, rate 57    Assessment:     Milagro Miller is a 89 y.o. female with a PMHx of HTN, dementia, HLD, T2DM presenting with acute encephalopathy. Patient admitted to [facility] for acute encephalopathy due to UTI      Plan:     Acute Encephalopathy  Urinary Tract Infection  - Patient less responsive this morning per nursing home  - UA 3+ LE, >100 WBC, occasional bacteria, 2+ blood. Urine culture in process  - WBC wnl, TSH wnl, afebrile  - CT Head with no intracranial hemorrhage; has remote small infarcts  - CXR with bibasilar atelectasis, no focal consolidation  - PT/OT/SLP  - CTX x7 days for UTI  - Will follow urine culture     HTN  - BP on presentation 114/57  - Continue home amlodipine 5mg      Dementia  - Per family, patient is at her baseline but less cooperative than usual  - Continue home donepezil 5mg    HLD  - Continue home statin    T2DM  - Last A1c 7.8% 6 years ago, repeat ordered  - Home regimen: Lantus 10u nightly  - LDSSI while inpatient       PPx: Lovenox  Diet: Consistent Carbohydrate  Code: Full    Disposition: pending treatment of UTI and resolution of encephalopathy       Radha Vaca MD   [facility] IM PGY-1    [facility] Medicine Hospitalist Phone Numbers:   [facility] Hospitalist Medicine Team A (Noemi/Randell): [phone]  [facility] Hospitalist Medicine Team B (Lor/Cherie):  [phone]

## 2025-05-16 NOTE — PROGRESS NOTES
"[facility] Medicine Progress Note    Primary Team: [facility] Hospitalist Team B  Attending Physician: Germán Horowitz MD  Resident: Julia  Intern: Lio    Subjective:      This morning patient laying in bed. Comfortable. Orientated to name only.      Objective:     Last 24 Hour Vital Signs:  BP  Min: 111/59  Max: 162/83  Temp  Av °F (35.6 °C)  Min: 91 °F (32.8 °C)  Max: 98.6 °F (37 °C)  Pulse  Av.6  Min: 50  Max: 96  Resp  Av.1  Min: 10  Max: 18  SpO2  Av.8 %  Min: 94 %  Max: 100 %  Height  Av' 6" (167.6 cm)  Min: 5' 6" (167.6 cm)  Max: 5' 6" (167.6 cm)  Weight  Av.1 kg (154 lb 9.4 oz)  Min: 68 kg (150 lb)  Max: 72.2 kg (159 lb 2.8 oz)  I/O last 3 completed shifts:  In: 60 [P.O.:60]  Out: 600 [Urine:600]    Physical Examination:  Physical Exam  Constitutional:       General: She is not in acute distress.     Comments: Laying in bed on her side, gazing into the distance, occasionally muttering noises  Not following commands   HENT:      Head: Normocephalic and atraumatic.   Eyes:      Extraocular Movements: Extraocular movements intact.      Conjunctiva/sclera: Conjunctivae normal.   Cardiovascular:      Rate and Rhythm: Normal rate and regular rhythm.   Pulmonary:      Effort: Pulmonary effort is normal. No respiratory distress.      Breath sounds: Normal breath sounds.   Abdominal:      General: Abdomen is flat.      Palpations: Abdomen is soft.   Musculoskeletal:      Comments: 2+ BLE edema up to mid shin  Right hand tremor   Skin:     Capillary Refill: Capillary refill takes less than 2 seconds.   Neurological:      Mental Status: She is disoriented.      Comments: A+O x0, not following commands. Eyes follow voice       Laboratory:  Laboratory Data Reviewed: yes    Microbiology Data Reviewed: yes  Pertinent Findings:  Urine culture (5/15) - pending     Other Results:  EKG (my interpretation): normal EKG, normal sinus rhythm, unchanged from previous tracings.    Current Medications:     " Infusions:       Scheduled:   amLODIPine  5 mg Oral Nightly    atorvastatin  20 mg Oral QHS    cefTRIAXone (Rocephin) IV (PEDS and ADULTS)  1 g Intravenous Q24H    donepeziL  5 mg Oral Daily    enoxparin  40 mg Subcutaneous Daily        PRN:    Current Facility-Administered Medications:     dextrose 50%, 12.5 g, Intravenous, PRN    dextrose 50%, 12.5 g, Intravenous, PRN    dextrose 50%, 25 g, Intravenous, PRN    dextrose 50%, 25 g, Intravenous, PRN    glucagon (human recombinant), 1 mg, Intramuscular, PRN    glucagon (human recombinant), 1 mg, Intramuscular, PRN    glucose, 16 g, Oral, PRN    glucose, 16 g, Oral, PRN    glucose, 24 g, Oral, PRN    glucose, 24 g, Oral, PRN    insulin aspart U-100, 0-5 Units, Subcutaneous, QID (AC + HS) PRN    melatonin, 6 mg, Oral, Nightly PRN    sodium chloride 0.9%, 10 mL, Intravenous, Q12H PRN    Antibiotics and Day Number of Therapy:  CTX (5/15)    Assessment:     Milagro Miller is a 89 y.o. female with a PMHx of HTN, dementia, HLD, T2DM presenting with acute encephalopathy. Patient admitted to [facility] for acute encephalopathy 2/2 to UTI.    Plan:     Acute Encephalopathy  Urinary Tract Infection  - Patient less responsive this morning per nursing home  - UA 3+ LE, >100 WBC, occasional bacteria, 2+ blood. Urine culture in process  - WBC wnl, TSH wnl, afebrile  - CT Head with no intracranial hemorrhage; has remote small infarcts  - CXR with bibasilar atelectasis, no focal consolidation  - PT/OT/SLP  - CTX x7 days for UTI  - Will follow urine culture data     HTN  - BP on presentation 114/57  - Continue home amlodipine 5mg      Dementia  - Per family, patient is at her baseline but less cooperative than usual  - Continue home donepezil 5mg     HLD  - Continue home statin     T2DM  - Last A1c 7.8% 6 years ago, repeat 8.9  - Home regimen: Lantus 10u nightly  - LDSSI while inpatient      PPx: Lovenox  Diet: Consistent Carbohydrate  Code: Full     Disposition: pending treatment  of UTI and resolution of encephalopathy     Rigoberto Dumont MD  [facility] HO-III    [facility] Hospitalist Pager numbers:   [facility] Hospitalist Medicine Team A (Noemi/Randell): [phone]  [facility] Hospitalist Medicine Team B (Lor/Cherie):  [phone]

## 2025-05-16 NOTE — PLAN OF CARE
Patient is at her current functional baseline for ADLs and functional mobility.  History limited as patient is unable to provide history due to mental status. Per chart review, patient is a intermediate NH resident and primarily bed bound. Pt w/ poor communication and ability to follow commands. Patient requires total x2 for bed mobility and total A x1 for feeding. Recommend patient return to NH and receive 24/7 care.   Discharge OT services; re-consult if status changes.         Problem: Occupational Therapy  Goal: Occupational Therapy Goal  Outcome: Adequate for Care Transition

## 2025-05-16 NOTE — NURSING
Pt situated to , vs/BG taken, & stable. JUAN ANTONIO orientation & pain level as pt is confused. Pt is bedbound. Waffle mattress & purwick in place. Skin & sacrum intact. BLE edemea noted & zflex boots are on. Pt able to drink some lemonade at bedside only & had a BM once transferred to bed from stretcher. Bed in lowest position. Call light within reach.

## 2025-05-16 NOTE — PLAN OF CARE
Problem: Adult Inpatient Plan of Care  Goal: Plan of Care Review  Outcome: Progressing     Problem: Diabetes Comorbidity  Goal: Blood Glucose Level Within Targeted Range  Outcome: Progressing     Problem: Fall Injury Risk  Goal: Absence of Fall and Fall-Related Injury  Outcome: Progressing     Problem: Skin Injury Risk Increased  Goal: Skin Health and Integrity  Outcome: Progressing     Problem: Confusion Acute  Goal: Optimal Cognitive Function  Outcome: Progressing     Problem: Confusion Chronic  Goal: Optimal Cognitive Function  Outcome: Progressing     Problem: UTI (Urinary Tract Infection)  Goal: Improved Infection Symptoms  Outcome: Progressing     Problem: Comorbidity Management  Goal: Blood Pressure in Desired Range  Outcome: Progressing   Disoriented x 4. JUAN ANTONIO pain or orientation questions. Sacrum intact. BLE edema. Zflex boots & purwick in place. Diet changed to mince/moist. Meds are crushed & given in pudding/apple sauce. Bed in lowest position. Call light within reach.

## 2025-05-16 NOTE — PLAN OF CARE
Pt seen in room for swallow study, pt was chewing away on coban that she had pulled off her wrist. Pt unaware that it was not a food item. Pt was confused, able to state her name only. Pt did agree to PO trials. Pt safe for minced/moist diet and thin liquids, will add boost for caloric support.

## 2025-05-16 NOTE — NURSING
Rapid Response Nurse Follow-up Note     Followed up with patient for proactive rounding.   No acute issues at this time. VSS with T 98.7 Ax on bairhugger; turned off keeping blankets on pt. Reviewed plan of care with Bedside nurse, Chelsey.   Team will continue to follow.  Please call Rapid Response RN, Sera Ndiaye RN with any questions or concerns at N Phone [phone].

## 2025-05-16 NOTE — PT/OT/SLP EVAL
Physical Therapy Evaluation and Discharge Note    Patient Name:  Milagro Miller   MRN:  [id number]    Recommendations:     Discharge Recommendations: No Therapy Indicated  Discharge Equipment Recommendations: none, other (see comments) (NH resident - NH to provide for any needs)   Barriers to discharge: None    Assessment:     Milagro Miller is a 89 y.o. female admitted with a medical diagnosis of Urinary tract infection without hematuria. .  At this time, patient is functioning at their prior level of function per chart and does not require further acute PT services.     Recent Surgery: * No surgery found *      Plan:     During this hospitalization, patient does not require further acute PT services.  Please re-consult if situation changes.      Subjective     Chief Complaint: pt mumbling - not groaning or grimacing or any other indication of pain  Patient/Family Comments/goals: unable to state -   Pain/Comfort:  Pain Rating 1: other (see comments) (pt unable to state but not moaning or grimacing to indicate any pain)  Pain Rating Post-Intervention 1: other (see comments) (no rated pain or gestures of pain)    Patients cultural, spiritual, Restorationist conflicts given the current situation: no    Living Environment:  Pt is a [facility] resident at [facility].    Equipment used at home: other (see comments) (pt unable to state).  Upon discharge, patient will have assistance from NH staff.    Objective:     Communicated with nsg prior to session.  Patient found left sidelying with bed alarm upon PT entry to room.    General Precautions: Standard, fall    Orthopedic Precautions:N/A   Braces: N/A  Respiratory Status: Room air    Exams:  Cognitive:   A & O x 0       Pt mumbling and unable to be understood.  Pt unable to follow simple commands or multi sensory cues provided.  Also asked if wanted to sit up/get OOB to see if automatic response would occur but pt continues to stare blankly and mumble.       Functional Mobility:  Dependent for all mobility    AM-PAC 6 CLICK MOBILITY  Total Score:6       Treatment and Education:  Pt unable to actively participate in acute physical therapy services safely at this time.  Discharge acute PT.  Recs for pt to return to detention care at nursing home as prior to admit.    AM-PAC 6 CLICK MOBILITY  Total Score:6     Patient left left sidelying with all lines intact, call button in reach, bed alarm on, and nsg notified.  Nsg present part of session.    GOALS:   Multidisciplinary Problems       Physical Therapy Goals       Not on file                    DME Justifications:  No DME recommended requiring DME justifications    History:     Past Medical History:   Diagnosis Date    Diabetes mellitus     Hyperlipidemia        Past Surgical History:   Procedure Laterality Date    HYSTERECTOMY         Time Tracking:     PT Received On: 05/16/25  PT Start Time: 0900     PT Stop Time: 0911  PT Total Time (min): 11 min     Billable Minutes: Evaluation 11      05/16/2025

## 2025-05-16 NOTE — PT/OT/SLP EVAL
Speech Language Pathology Evaluation  Bedside Swallow    Patient Name:  Milagro Miller   MRN:  [id number]  Admitting Diagnosis: Urinary tract infection without hematuria    Recommendations:     Diet recommendations:  Minced & Moist Diet - IDDSI Level 5, Thin liquids - IDDSI Level 0   Aspiration Precautions: upright for meals, slow rate, needs feeding assist, crush meds    General Precautions: Standard, fall  Communication strategies:  none    Assessment:     Milagro Miller is a 89 y.o. female admitted with UTI, underlying dementia hx who may continue on an oral diet.    History per MD      Milagro Miller is a 89 y.o. female with a PMHx of HTN, dementia, HLD, T2DM. The patient presented on 5/15/2025 for evaluation of acute encephalopathy.     History limited as patient unable to provide history due to mental status. Patient's family called to obtain additional history.     Patient lives at [facility]. Nursing home called EMS today because patient was less verbal and responsive than usual. Per family, this is her baseline but less cooperative than usual. EMS noted lower extremity edema, blood glucose within normal limits. Patient has a history of dementia.     Past Medical History:   Diagnosis Date    Diabetes mellitus     Hyperlipidemia        Past Surgical History:   Procedure Laterality Date    HYSTERECTOMY         Social History: Patient lives at [facility].    CT head: 1. No evidence of acute intracranial hemorrhage, mass effect, or midline shift.   2. 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  No focal consolidation.     Prior diet: unknown     Subjective     Pt seen at bedside for swallow eval. Pt found chewing on coban and remained confused.   Patient goals: none stated      Pain/Comfort:  Pain Rating 1:  (does not state any pain)    Respiratory Status: room  "air     Objective:   Pt seen at bedside, she is awake and able to state her name.   Coban that had been chewed on was taken away .   Pt stated "I kept pulling on it."    Oral Musculature Evaluation  Oral Musculature: unable to assess due to poor participation/comprehension  Dentition: scattered dentition, upper dentures  Mucosal Quality: adequate  Volitional Cough: not elicited  Volitional Swallow: timely  Voice Prior to PO Intake: clear voice    Bedside Swallow Eval:   Consistencies Assessed:  Thin liquids water by straw   Puree applesauce   Soft solids banana      Oral Phase:   Slow oral transit time  Adequate mastication  Fair ap transfer  Good oral seal and labial closure    Pharyngeal Phase:   no overt clinical signs/symptoms of aspiration  no overt clinical signs/symptoms of pharyngeal dysphagia  multiple spontaneous swallows  No coughing or choking appreciated     Compensatory Strategies  Feeding assist  Slow rate  Crushed meds for larger pills if needed    Treatment: Unable to educate due to pt's poor cognition, secure chat sent to team.   SLP to sign off.     Goals:   Multidisciplinary Problems       SLP Goals       Not on file                    Plan:     Patient to be seen:      Plan of Care expires:     Plan of Care reviewed with:  patient   SLP Follow-Up:  No       Discharge recommendations:  No Therapy Indicated   Barriers to Discharge:  none    Time Tracking:     SLP Treatment Date:   05/16/25  Speech Start Time:  1002  Speech Stop Time:  1020     Speech Total Time (min):  18 min    Billable Minutes: Eval Swallow and Oral Function 18    05/16/2025         "

## 2025-05-17 LAB
ABSOLUTE EOSINOPHIL (OHS): 0 K/UL
ABSOLUTE MONOCYTE (OHS): 0.45 K/UL (ref 0.3–1)
ABSOLUTE NEUTROPHIL COUNT (OHS): 2.73 K/UL (ref 1.8–7.7)
ALBUMIN SERPL BCP-MCNC: 3.3 G/DL (ref 3.5–5.2)
ALP SERPL-CCNC: 63 UNIT/L (ref 40–150)
ALT SERPL W/O P-5'-P-CCNC: 16 UNIT/L (ref 10–44)
ANION GAP (OHS): 13 MMOL/L (ref 8–16)
AST SERPL-CCNC: 28 UNIT/L (ref 11–45)
BASOPHILS # BLD AUTO: 0.01 K/UL
BASOPHILS NFR BLD AUTO: 0.2 %
BILIRUB SERPL-MCNC: 0.4 MG/DL (ref 0.1–1)
BUN SERPL-MCNC: 13 MG/DL (ref 8–23)
CALCIUM SERPL-MCNC: 9.9 MG/DL (ref 8.7–10.5)
CHLORIDE SERPL-SCNC: 105 MMOL/L (ref 95–110)
CO2 SERPL-SCNC: 26 MMOL/L (ref 23–29)
CREAT SERPL-MCNC: 0.8 MG/DL (ref 0.5–1.4)
ERYTHROCYTE [DISTWIDTH] IN BLOOD BY AUTOMATED COUNT: 15.2 % (ref 11.5–14.5)
GFR SERPLBLD CREATININE-BSD FMLA CKD-EPI: >60 ML/MIN/1.73/M2
GLUCOSE SERPL-MCNC: 205 MG/DL (ref 70–110)
HCT VFR BLD AUTO: 35.7 % (ref 37–48.5)
HGB BLD-MCNC: 11.6 GM/DL (ref 12–16)
IMM GRANULOCYTES # BLD AUTO: 0.03 K/UL (ref 0–0.04)
IMM GRANULOCYTES NFR BLD AUTO: 0.7 % (ref 0–0.5)
LYMPHOCYTES # BLD AUTO: 1.03 K/UL (ref 1–4.8)
MAGNESIUM SERPL-MCNC: 1.8 MG/DL (ref 1.6–2.6)
MCH RBC QN AUTO: 28.1 PG (ref 27–31)
MCHC RBC AUTO-ENTMCNC: 32.5 G/DL (ref 32–36)
MCV RBC AUTO: 86 FL (ref 82–98)
NUCLEATED RBC (/100WBC) (OHS): 0 /100 WBC
PLATELET # BLD AUTO: 252 K/UL (ref 150–450)
PMV BLD AUTO: 11.3 FL (ref 9.2–12.9)
POCT GLUCOSE: 182 MG/DL (ref 70–110)
POCT GLUCOSE: 192 MG/DL (ref 70–110)
POCT GLUCOSE: 207 MG/DL (ref 70–110)
POTASSIUM SERPL-SCNC: 3.9 MMOL/L (ref 3.5–5.1)
PROT SERPL-MCNC: 7.2 GM/DL (ref 6–8.4)
RBC # BLD AUTO: 4.13 M/UL (ref 4–5.4)
RELATIVE EOSINOPHIL (OHS): 0 %
RELATIVE LYMPHOCYTE (OHS): 24.2 % (ref 18–48)
RELATIVE MONOCYTE (OHS): 10.6 % (ref 4–15)
RELATIVE NEUTROPHIL (OHS): 64.3 % (ref 38–73)
SODIUM SERPL-SCNC: 144 MMOL/L (ref 136–145)
WBC # BLD AUTO: 4.25 K/UL (ref 3.9–12.7)

## 2025-05-17 PROCEDURE — 63600175 PHARM REV CODE 636 W HCPCS

## 2025-05-17 PROCEDURE — 11000001 HC ACUTE MED/SURG PRIVATE ROOM

## 2025-05-17 PROCEDURE — 85025 COMPLETE CBC W/AUTO DIFF WBC: CPT

## 2025-05-17 PROCEDURE — 36415 COLL VENOUS BLD VENIPUNCTURE: CPT

## 2025-05-17 PROCEDURE — 82040 ASSAY OF SERUM ALBUMIN: CPT

## 2025-05-17 PROCEDURE — 83735 ASSAY OF MAGNESIUM: CPT

## 2025-05-17 PROCEDURE — 25000003 PHARM REV CODE 250

## 2025-05-17 RX ADMIN — INSULIN ASPART 1 UNITS: 100 INJECTION, SOLUTION INTRAVENOUS; SUBCUTANEOUS at 09:05

## 2025-05-17 RX ADMIN — ATORVASTATIN CALCIUM 20 MG: 20 TABLET, FILM COATED ORAL at 08:05

## 2025-05-17 RX ADMIN — ENOXAPARIN SODIUM 40 MG: 40 INJECTION SUBCUTANEOUS at 05:05

## 2025-05-17 RX ADMIN — CEFTRIAXONE SODIUM 1 G: 1 INJECTION, POWDER, FOR SOLUTION INTRAMUSCULAR; INTRAVENOUS at 04:05

## 2025-05-17 RX ADMIN — DONEPEZIL HYDROCHLORIDE 5 MG: 5 TABLET ORAL at 09:05

## 2025-05-17 RX ADMIN — AMLODIPINE BESYLATE 5 MG: 5 TABLET ORAL at 08:05

## 2025-05-17 NOTE — PLAN OF CARE
Problem: Adult Inpatient Plan of Care  Goal: Plan of Care Review  Outcome: Progressing  Goal: Patient-Specific Goal (Individualized)  Outcome: Progressing     Problem: Diabetes Comorbidity  Goal: Blood Glucose Level Within Targeted Range  Outcome: Progressing     Problem: Fall Injury Risk  Goal: Absence of Fall and Fall-Related Injury  Outcome: Progressing     Problem: Skin Injury Risk Increased  Goal: Skin Health and Integrity  Outcome: Progressing     Problem: Confusion Acute  Goal: Optimal Cognitive Function  Outcome: Progressing     Problem: UTI (Urinary Tract Infection)  Goal: Improved Infection Symptoms  Outcome: Progressing     Pt disoriented x4. Medication administered per MAR. Weight shifting in bed Q2 hrs. Assisted with meals. Blood glucose monitored. Bed alarm active.

## 2025-05-17 NOTE — PLAN OF CARE
Problem: Adult Inpatient Plan of Care  Goal: Plan of Care Review  Outcome: Progressing  Goal: Optimal Comfort and Wellbeing  Outcome: Progressing     Problem: Diabetes Comorbidity  Goal: Blood Glucose Level Within Targeted Range  Outcome: Progressing     Problem: Fall Injury Risk  Goal: Absence of Fall and Fall-Related Injury  Outcome: Progressing     Problem: Skin Injury Risk Increased  Goal: Skin Health and Integrity  Outcome: Progressing     Problem: Confusion Acute  Goal: Optimal Cognitive Function  Outcome: Progressing     Pt safety maintained. Medication administered per MAR. Weight shifting assistance provided. Waffle mattress overlay and zflex boot in place. TurnQ2. Bed in lowest position, locked and bed alarms on. Call light w/in pt's reach.

## 2025-05-18 LAB
ABSOLUTE EOSINOPHIL (OHS): 0.1 K/UL
ABSOLUTE MONOCYTE (OHS): 0.68 K/UL (ref 0.3–1)
ABSOLUTE NEUTROPHIL COUNT (OHS): 2.2 K/UL (ref 1.8–7.7)
ALBUMIN SERPL BCP-MCNC: 3 G/DL (ref 3.5–5.2)
ALP SERPL-CCNC: 57 UNIT/L (ref 40–150)
ALT SERPL W/O P-5'-P-CCNC: 17 UNIT/L (ref 10–44)
ANION GAP (OHS): 10 MMOL/L (ref 8–16)
AST SERPL-CCNC: 32 UNIT/L (ref 11–45)
BACTERIA UR CULT: ABNORMAL
BASOPHILS # BLD AUTO: 0.02 K/UL
BASOPHILS NFR BLD AUTO: 0.4 %
BILIRUB SERPL-MCNC: 0.4 MG/DL (ref 0.1–1)
BUN SERPL-MCNC: 13 MG/DL (ref 8–23)
CALCIUM SERPL-MCNC: 9.4 MG/DL (ref 8.7–10.5)
CHLORIDE SERPL-SCNC: 106 MMOL/L (ref 95–110)
CO2 SERPL-SCNC: 25 MMOL/L (ref 23–29)
CREAT SERPL-MCNC: 0.7 MG/DL (ref 0.5–1.4)
ERYTHROCYTE [DISTWIDTH] IN BLOOD BY AUTOMATED COUNT: 15.1 % (ref 11.5–14.5)
GFR SERPLBLD CREATININE-BSD FMLA CKD-EPI: >60 ML/MIN/1.73/M2
GLUCOSE SERPL-MCNC: 111 MG/DL (ref 70–110)
HCT VFR BLD AUTO: 35.8 % (ref 37–48.5)
HGB BLD-MCNC: 11.5 GM/DL (ref 12–16)
IMM GRANULOCYTES # BLD AUTO: 0.01 K/UL (ref 0–0.04)
IMM GRANULOCYTES NFR BLD AUTO: 0.2 % (ref 0–0.5)
LYMPHOCYTES # BLD AUTO: 1.5 K/UL (ref 1–4.8)
MAGNESIUM SERPL-MCNC: 1.8 MG/DL (ref 1.6–2.6)
MCH RBC QN AUTO: 28.4 PG (ref 27–31)
MCHC RBC AUTO-ENTMCNC: 32.1 G/DL (ref 32–36)
MCV RBC AUTO: 88 FL (ref 82–98)
NUCLEATED RBC (/100WBC) (OHS): 0 /100 WBC
PLATELET # BLD AUTO: 214 K/UL (ref 150–450)
PMV BLD AUTO: 11.4 FL (ref 9.2–12.9)
POCT GLUCOSE: 130 MG/DL (ref 70–110)
POCT GLUCOSE: 132 MG/DL (ref 70–110)
POCT GLUCOSE: 190 MG/DL (ref 70–110)
POCT GLUCOSE: 217 MG/DL (ref 70–110)
POTASSIUM SERPL-SCNC: 3.9 MMOL/L (ref 3.5–5.1)
PROT SERPL-MCNC: 6.8 GM/DL (ref 6–8.4)
RBC # BLD AUTO: 4.05 M/UL (ref 4–5.4)
RELATIVE EOSINOPHIL (OHS): 2.2 %
RELATIVE LYMPHOCYTE (OHS): 33.3 % (ref 18–48)
RELATIVE MONOCYTE (OHS): 15.1 % (ref 4–15)
RELATIVE NEUTROPHIL (OHS): 48.8 % (ref 38–73)
SODIUM SERPL-SCNC: 141 MMOL/L (ref 136–145)
WBC # BLD AUTO: 4.51 K/UL (ref 3.9–12.7)

## 2025-05-18 PROCEDURE — 25000003 PHARM REV CODE 250

## 2025-05-18 PROCEDURE — 82310 ASSAY OF CALCIUM: CPT

## 2025-05-18 PROCEDURE — 83735 ASSAY OF MAGNESIUM: CPT

## 2025-05-18 PROCEDURE — 63600175 PHARM REV CODE 636 W HCPCS

## 2025-05-18 PROCEDURE — 85025 COMPLETE CBC W/AUTO DIFF WBC: CPT

## 2025-05-18 PROCEDURE — 21400001 HC TELEMETRY ROOM

## 2025-05-18 PROCEDURE — 36415 COLL VENOUS BLD VENIPUNCTURE: CPT

## 2025-05-18 RX ADMIN — AMLODIPINE BESYLATE 5 MG: 5 TABLET ORAL at 08:05

## 2025-05-18 RX ADMIN — DONEPEZIL HYDROCHLORIDE 5 MG: 5 TABLET ORAL at 08:05

## 2025-05-18 RX ADMIN — CEFTRIAXONE SODIUM 1 G: 1 INJECTION, POWDER, FOR SOLUTION INTRAMUSCULAR; INTRAVENOUS at 03:05

## 2025-05-18 RX ADMIN — INSULIN ASPART 1 UNITS: 100 INJECTION, SOLUTION INTRAVENOUS; SUBCUTANEOUS at 08:05

## 2025-05-18 RX ADMIN — ATORVASTATIN CALCIUM 20 MG: 20 TABLET, FILM COATED ORAL at 08:05

## 2025-05-18 RX ADMIN — ENOXAPARIN SODIUM 40 MG: 40 INJECTION SUBCUTANEOUS at 05:05

## 2025-05-18 NOTE — PROGRESS NOTES
.[facility] Medicine Progress Note    Primary Team: [facility] Hospitalist Team B  Attending Physician: Germán Horowitz MD  Resident: Julia  Intern: Lio    Subjective:      This morning patient laying in bed. Comfortable. Alert, not following commands.      Objective:     Last 24 Hour Vital Signs:  BP  Min: 99/56  Max: 138/65  Temp  Av.5 °F (36.9 °C)  Min: 98 °F (36.7 °C)  Max: 99.4 °F (37.4 °C)  Pulse  Av  Min: 62  Max: 82  Resp  Av.8  Min: 16  Max: 20  SpO2  Av.7 %  Min: 94 %  Max: 99 %  I/O last 3 completed shifts:  In: 150 [P.O.:150]  Out: 1625 [Urine:1625]    Physical Examination:  Physical Exam  Constitutional:       General: She is not in acute distress.     Comments: Laying in bed on her side,  Not following commands   HENT:      Head: Normocephalic and atraumatic.   Eyes:      Extraocular Movements: Extraocular movements intact.      Conjunctiva/sclera: Conjunctivae normal.   Cardiovascular:      Rate and Rhythm: Normal rate and regular rhythm.   Pulmonary:      Effort: Pulmonary effort is normal. No respiratory distress.      Breath sounds: Normal breath sounds.   Abdominal:      General: Abdomen is flat.      Palpations: Abdomen is soft.   Musculoskeletal:      Comments: 2+ BLE edema up to mid shin  Right hand tremor   Skin:     Capillary Refill: Capillary refill takes less than 2 seconds.   Neurological:      Mental Status: unable to assess     Comments: not following commands. Eyes follow voice       Laboratory:  Laboratory Data Reviewed: yes    Microbiology Data Reviewed: yes  Pertinent Findings:  Urine culture (5/15) - pending     Other Results:  EKG (my interpretation): normal EKG, normal sinus rhythm, unchanged from previous tracings.    Current Medications:     Infusions:       Scheduled:   amLODIPine  5 mg Oral Nightly    atorvastatin  20 mg Oral QHS    cefTRIAXone (Rocephin) IV (PEDS and ADULTS)  1 g Intravenous Q24H    donepeziL  5 mg Oral Daily    enoxparin  40 mg  Subcutaneous Daily        PRN:    Current Facility-Administered Medications:     dextrose 50%, 12.5 g, Intravenous, PRN    dextrose 50%, 12.5 g, Intravenous, PRN    dextrose 50%, 25 g, Intravenous, PRN    dextrose 50%, 25 g, Intravenous, PRN    glucagon (human recombinant), 1 mg, Intramuscular, PRN    glucagon (human recombinant), 1 mg, Intramuscular, PRN    glucose, 16 g, Oral, PRN    glucose, 16 g, Oral, PRN    glucose, 24 g, Oral, PRN    glucose, 24 g, Oral, PRN    insulin aspart U-100, 0-5 Units, Subcutaneous, QID (AC + HS) PRN    melatonin, 6 mg, Oral, Nightly PRN    sodium chloride 0.9%, 10 mL, Intravenous, Q12H PRN    Antibiotics and Day Number of Therapy:  CTX (5/15)    Assessment:     Milagro Miller is a 89 y.o. female with a PMHx of HTN, dementia, HLD, T2DM presenting with acute encephalopathy. Patient admitted to [facility] for acute encephalopathy 2/2 to UTI.    Plan:     Acute Encephalopathy  Urinary Tract Infection 2/2 Klebsiella pneumoniae   - Patient less responsive this morning per nursing home  - UA 3+ LE, >100 WBC, occasional bacteria, 2+ blood. Urine culture in process  - WBC wnl, TSH wnl, afebrile  - CT Head with no intracranial hemorrhage; has remote small infarcts  - CXR with bibasilar atelectasis, no focal consolidation  - PT/OT/SLP  - Urine culture growing K. Pneumoniae, pan sensitive    - CTX x7 days for UTI     HTN  - BP on presentation 114/57  - Continue home amlodipine 5mg      Dementia  - Per family, patient is at her baseline but less cooperative than usual  - Continue home donepezil 5mg     HLD  - Continue home statin     T2DM  - Last A1c 7.8% 6 years ago, repeat 8.9  - Home regimen: Lantus 10u nightly  - LDSSI while inpatient      PPx: Lovenox  Diet: Consistent Carbohydrate  Code: Full     Disposition: pending treatment of UTI and resolution of encephalopathy     Rigoberto Dumont MD  [facility] HO-III    [facility] Hospitalist Pager numbers:   [facility] Hospitalist Medicine  Team A (Noemi/Randell): [phone]  [facility] Hospitalist Medicine Team B (Lor/Cherie):  [phone]

## 2025-05-18 NOTE — PLAN OF CARE
Problem: Adult Inpatient Plan of Care  Goal: Plan of Care Review  Outcome: Progressing  Goal: Patient-Specific Goal (Individualized)  Outcome: Progressing     Problem: Diabetes Comorbidity  Goal: Blood Glucose Level Within Targeted Range  Outcome: Progressing     Problem: Fall Injury Risk  Goal: Absence of Fall and Fall-Related Injury  Outcome: Progressing     Problem: Skin Injury Risk Increased  Goal: Skin Health and Integrity  Outcome: Progressing

## 2025-05-18 NOTE — PLAN OF CARE
Problem: Adult Inpatient Plan of Care  Goal: Plan of Care Review  Outcome: Progressing  Goal: Patient-Specific Goal (Individualized)  Outcome: Progressing  Goal: Optimal Comfort and Wellbeing  Outcome: Progressing     Problem: Diabetes Comorbidity  Goal: Blood Glucose Level Within Targeted Range  Outcome: Progressing     Problem: Fall Injury Risk  Goal: Absence of Fall and Fall-Related Injury  Outcome: Progressing     Problem: Skin Injury Risk Increased  Goal: Skin Health and Integrity  Outcome: Progressing     Problem: Confusion Acute  Goal: Optimal Cognitive Function  Outcome: Progressing     Problem: Confusion Chronic  Goal: Optimal Cognitive Function  Outcome: Progressing     Problem: UTI (Urinary Tract Infection)  Goal: Improved Infection Symptoms  Outcome: Progressing    AAOx4. PO medication crushed with pudding. IX ABX. Weight shifting in bed Q2 hrs. BG monitored. Bed alarm active. Call light within reach.

## 2025-05-18 NOTE — PROGRESS NOTES
[facility] Medicine Progress Note    Primary Team: [facility] Hospitalist Team B  Attending Physician: Germán Horowitz MD  Resident: Julia  Intern: Lio    Subjective:      This morning patient laying in bed. Comfortable. Alert, not following commands.      Objective:     Last 24 Hour Vital Signs:  BP  Min: 124/59  Max: 145/89  Temp  Av.6 °F (37 °C)  Min: 98 °F (36.7 °C)  Max: 99.6 °F (37.6 °C)  Pulse  Av.5  Min: 75  Max: 104  Resp  Av.5  Min: 16  Max: 20  SpO2  Av.7 %  Min: 94 %  Max: 95 %  I/O last 3 completed shifts:  In: 630 [P.O.:630]  Out: 1900 [Urine:1900]    Physical Examination:  Physical Exam  Constitutional:       General: She is not in acute distress.     Comments: Laying in bed on her side,  Not following commands   HENT:      Head: Normocephalic and atraumatic.   Eyes:      Extraocular Movements: Extraocular movements intact.      Conjunctiva/sclera: Conjunctivae normal.   Cardiovascular:      Rate and Rhythm: Normal rate and regular rhythm.   Pulmonary:      Effort: Pulmonary effort is normal. No respiratory distress.      Breath sounds: Normal breath sounds.   Abdominal:      General: Abdomen is flat.      Palpations: Abdomen is soft.   Musculoskeletal:      Comments: 2+ BLE edema up to mid shin  Right hand tremor   Skin:     Capillary Refill: Capillary refill takes less than 2 seconds.   Neurological:      Mental Status: unable to assess     Comments: not following commands. Eyes follow voice       Laboratory:  Laboratory Data Reviewed: yes    Microbiology Data Reviewed: yes  Pertinent Findings:  Urine culture (5/15) - pending     Other Results:  EKG (my interpretation): normal EKG, normal sinus rhythm, unchanged from previous tracings.    Current Medications:     Infusions:       Scheduled:   amLODIPine  5 mg Oral Nightly    atorvastatin  20 mg Oral QHS    cefTRIAXone (Rocephin) IV (PEDS and ADULTS)  1 g Intravenous Q24H    donepeziL  5 mg Oral Daily    enoxparin  40 mg Subcutaneous  Daily        PRN:    Current Facility-Administered Medications:     dextrose 50%, 12.5 g, Intravenous, PRN    dextrose 50%, 12.5 g, Intravenous, PRN    dextrose 50%, 25 g, Intravenous, PRN    dextrose 50%, 25 g, Intravenous, PRN    glucagon (human recombinant), 1 mg, Intramuscular, PRN    glucagon (human recombinant), 1 mg, Intramuscular, PRN    glucose, 16 g, Oral, PRN    glucose, 16 g, Oral, PRN    glucose, 24 g, Oral, PRN    glucose, 24 g, Oral, PRN    insulin aspart U-100, 0-5 Units, Subcutaneous, QID (AC + HS) PRN    melatonin, 6 mg, Oral, Nightly PRN    sodium chloride 0.9%, 10 mL, Intravenous, Q12H PRN    Antibiotics and Day Number of Therapy:  CTX (5/15)    Assessment:     Milagro Miller is a 89 y.o. female with a PMHx of HTN, dementia, HLD, T2DM presenting with acute encephalopathy. Patient admitted to [facility] for acute encephalopathy 2/2 to UTI.    Plan:     Acute Encephalopathy  Urinary Tract Infection  - Patient less responsive this morning per nursing home  - UA 3+ LE, >100 WBC, occasional bacteria, 2+ blood. Urine culture in process  - WBC wnl, TSH wnl, afebrile  - CT Head with no intracranial hemorrhage; has remote small infarcts  - CXR with bibasilar atelectasis, no focal consolidation  - PT/OT/SLP  - CTX x7 days for UTI  - Will follow urine culture data     HTN  - BP on presentation 114/57  - Continue home amlodipine 5mg      Dementia  - Per family, patient is at her baseline but less cooperative than usual  - Continue home donepezil 5mg     HLD  - Continue home statin     T2DM  - Last A1c 7.8% 6 years ago, repeat 8.9  - Home regimen: Lantus 10u nightly  - LDSSI while inpatient      PPx: Lovenox  Diet: Consistent Carbohydrate  Code: Full     Disposition: pending treatment of UTI and resolution of encephalopathy     Meghan Franco MD  [facility] HO-II    [facility] Hospitalist Pager numbers:   [facility] Hospitalist Medicine Team A (Noemi/Randell): [phone]  [facility]  Team B (Lor/Cherie):  [phone]

## 2025-05-19 LAB
OHS QRS DURATION: 88 MS
OHS QTC CALCULATION: 449 MS
POCT GLUCOSE: 159 MG/DL (ref 70–110)
POCT GLUCOSE: 228 MG/DL (ref 70–110)

## 2025-05-19 PROCEDURE — 25000003 PHARM REV CODE 250

## 2025-05-19 PROCEDURE — 21400001 HC TELEMETRY ROOM

## 2025-05-19 PROCEDURE — 63600175 PHARM REV CODE 636 W HCPCS

## 2025-05-19 RX ADMIN — CEFTRIAXONE SODIUM 1 G: 1 INJECTION, POWDER, FOR SOLUTION INTRAMUSCULAR; INTRAVENOUS at 03:05

## 2025-05-19 RX ADMIN — AMLODIPINE BESYLATE 5 MG: 5 TABLET ORAL at 10:05

## 2025-05-19 RX ADMIN — INSULIN ASPART 2 UNITS: 100 INJECTION, SOLUTION INTRAVENOUS; SUBCUTANEOUS at 05:05

## 2025-05-19 RX ADMIN — ENOXAPARIN SODIUM 40 MG: 40 INJECTION SUBCUTANEOUS at 05:05

## 2025-05-19 RX ADMIN — ATORVASTATIN CALCIUM 20 MG: 20 TABLET, FILM COATED ORAL at 10:05

## 2025-05-19 RX ADMIN — DONEPEZIL HYDROCHLORIDE 5 MG: 5 TABLET ORAL at 09:05

## 2025-05-19 NOTE — PLAN OF CARE
Medicare Message     Important Message from Medicare regarding Discharge Appeal Rights Other (comments)Important Message from Medicare regarding Discharge Appeal Rights. Other (comments). Has comment. Taken on 5/19/25 1620   Date IMM was signed 5/19/2025   Time IMM was signed 1044

## 2025-05-19 NOTE — PLAN OF CARE
Problem: Adult Inpatient Plan of Care  Goal: Absence of Hospital-Acquired Illness or Injury  Outcome: Progressing  Goal: Optimal Comfort and Wellbeing  Outcome: Progressing     Problem: Diabetes Comorbidity  Goal: Blood Glucose Level Within Targeted Range  Outcome: Progressing     Problem: Fall Injury Risk  Goal: Absence of Fall and Fall-Related Injury  Outcome: Progressing     Problem: Skin Injury Risk Increased  Goal: Skin Health and Integrity  Outcome: Progressing     Problem: Confusion Acute  Goal: Optimal Cognitive Function  Outcome: Progressing     Problem: Confusion Chronic  Goal: Optimal Cognitive Function  Outcome: Progressing

## 2025-05-19 NOTE — PLAN OF CARE
The sw faxed the pt's updates to her long term [facility]. James via [email or web address].        05/19/25 7799   Post-Acute Status   Post-Acute Authorization Placement   Discharge Delays None known at this time   Discharge Plan   Discharge Plan A Return to nursing home   Discharge Plan B Other  (TBD)

## 2025-05-19 NOTE — PROGRESS NOTES
[facility] Medicine Progress Note    Primary Team: [facility] Hospitalist Team B  Attending Physician: Ricardo Crespo MD  Resident: Sandie  Intern: Lio    Subjective:      Patient is did not want to participate in exam or questioning today.      Objective:     Last 24 Hour Vital Signs:  BP  Min: 122/59  Max: 163/73  Temp  Av.4 °F (36.9 °C)  Min: 97.3 °F (36.3 °C)  Max: 99.5 °F (37.5 °C)  Pulse  Av.7  Min: 65  Max: 81  Resp  Av.3  Min: 16  Max: 20  SpO2  Av %  Min: 93 %  Max: 95 %  I/O last 3 completed shifts:  In: -   Out: 675 [Urine:675]    Physical Examination:  Physical Exam  Constitutional:       General: She is not in acute distress.     Comments: Laying in bed on her side,  Not following commands   HENT:      Head: Normocephalic and atraumatic.   Eyes:      Extraocular Movements: Extraocular movements intact.      Conjunctiva/sclera: Conjunctivae normal.   Cardiovascular:      Rate and Rhythm: Normal rate and regular rhythm.   Pulmonary:      Effort: Pulmonary effort is normal. No respiratory distress.      Breath sounds: Normal breath sounds.   Abdominal:      General: Abdomen is flat.      Palpations: Abdomen is soft.   Musculoskeletal:      Comments: 2+ BLE edema up to mid shin  Right hand tremor   Skin:     Capillary Refill: Capillary refill takes less than 2 seconds.   Neurological:      Mental Status: unable to assess     Comments: not following commands. Arousable but did not want to participate.    Laboratory:  Laboratory Data Reviewed: yes    Microbiology Data Reviewed: yes  Pertinent Findings:  Urine culture (5/15): 100k Klebsiella pneumoniae    Other Results:  EKG (my interpretation): normal EKG, normal sinus rhythm, unchanged from previous tracings.    Current Medications:     Infusions:       Scheduled:   amLODIPine  5 mg Oral Nightly    atorvastatin  20 mg Oral QHS    cefTRIAXone (Rocephin) IV (PEDS and ADULTS)  1 g Intravenous Q24H    donepeziL  5 mg Oral Daily    enoxparin   40 mg Subcutaneous Daily        PRN:    Current Facility-Administered Medications:     dextrose 50%, 12.5 g, Intravenous, PRN    dextrose 50%, 12.5 g, Intravenous, PRN    dextrose 50%, 25 g, Intravenous, PRN    dextrose 50%, 25 g, Intravenous, PRN    glucagon (human recombinant), 1 mg, Intramuscular, PRN    glucagon (human recombinant), 1 mg, Intramuscular, PRN    glucose, 16 g, Oral, PRN    glucose, 16 g, Oral, PRN    glucose, 24 g, Oral, PRN    glucose, 24 g, Oral, PRN    insulin aspart U-100, 0-5 Units, Subcutaneous, QID (AC + HS) PRN    melatonin, 6 mg, Oral, Nightly PRN    sodium chloride 0.9%, 10 mL, Intravenous, Q12H PRN    Antibiotics and Day Number of Therapy:  CTX (5/15)    Assessment:     Milagro Miller is a 89 y.o. female with a PMHx of HTN, dementia, HLD, T2DM presenting with acute encephalopathy. Patient admitted to [facility] for acute encephalopathy 2/2 to UTI.    Plan:     Acute Encephalopathy  Urinary Tract Infection 2/2 Klebsiella pneumoniae   - Patient less responsive this morning per nursing home  - UA 3+ LE, >100 WBC, occasional bacteria, 2+ blood. Urine culture in process  - WBC wnl, TSH wnl, afebrile  - CT Head with no intracranial hemorrhage; has remote small infarcts  - CXR with bibasilar atelectasis, no focal consolidation  - PT/OT/SLP  - Urine culture growing K. Pneumoniae, mostly pan sensitive    - CTX 4/7 days completed for UTI     HTN  - BP on presentation 114/57  - Continue home amlodipine 5mg      Dementia  Parkinson's like tremor  - Per family, patient is at her baseline but less cooperative than usual  - Continue home donepezil 5mg  - follow up with OP neurology      HLD  - Continue home statin     T2DM  - Last A1c 7.8% 6 years ago, repeat 8.9  - Home regimen: Lantus 10u nightly  - LDSSI while inpatient      PPx: Lovenox  Diet: Consistent Carbohydrate  Code: Full     Disposition: pending treatment of UTI and resolution of encephalopathy     Flores Hooper MD  [facility] HO-III  Team B    [facility] Medicine Hospitalist Pager numbers:   [facility] Hospitalist Medicine Team A (Noemi/Randell): [phone]  [facility] Hospitalist Medicine Team B (Lor/Cherie):  [phone]

## 2025-05-20 LAB
POCT GLUCOSE: 142 MG/DL (ref 70–110)
POCT GLUCOSE: 149 MG/DL (ref 70–110)
POCT GLUCOSE: 171 MG/DL (ref 70–110)
POCT GLUCOSE: 177 MG/DL (ref 70–110)
POCT GLUCOSE: 181 MG/DL (ref 70–110)
POCT GLUCOSE: 193 MG/DL (ref 70–110)
POCT GLUCOSE: 220 MG/DL (ref 70–110)

## 2025-05-20 PROCEDURE — 25000003 PHARM REV CODE 250

## 2025-05-20 PROCEDURE — 21400001 HC TELEMETRY ROOM

## 2025-05-20 PROCEDURE — 63600175 PHARM REV CODE 636 W HCPCS

## 2025-05-20 RX ORDER — CARBIDOPA AND LEVODOPA 10; 100 MG/1; MG/1
1 TABLET ORAL 3 TIMES DAILY
Status: DISCONTINUED | OUTPATIENT
Start: 2025-05-20 | End: 2025-05-21 | Stop reason: HOSPADM

## 2025-05-20 RX ADMIN — ATORVASTATIN CALCIUM 20 MG: 20 TABLET, FILM COATED ORAL at 09:05

## 2025-05-20 RX ADMIN — ENOXAPARIN SODIUM 40 MG: 40 INJECTION SUBCUTANEOUS at 06:05

## 2025-05-20 RX ADMIN — CARBIDOPA AND LEVODOPA 1 TABLET: 10; 100 TABLET ORAL at 09:05

## 2025-05-20 RX ADMIN — AMLODIPINE BESYLATE 5 MG: 5 TABLET ORAL at 09:05

## 2025-05-20 RX ADMIN — CARBIDOPA AND LEVODOPA 1 TABLET: 10; 100 TABLET ORAL at 02:05

## 2025-05-20 RX ADMIN — CEFTRIAXONE SODIUM 1 G: 1 INJECTION, POWDER, FOR SOLUTION INTRAMUSCULAR; INTRAVENOUS at 02:05

## 2025-05-20 RX ADMIN — DONEPEZIL HYDROCHLORIDE 5 MG: 5 TABLET ORAL at 09:05

## 2025-05-20 NOTE — PLAN OF CARE
Problem: Adult Inpatient Plan of Care  Goal: Plan of Care Review  Outcome: Progressing     Problem: Diabetes Comorbidity  Goal: Blood Glucose Level Within Targeted Range  Outcome: Progressing     Problem: Fall Injury Risk  Goal: Absence of Fall and Fall-Related Injury  Outcome: Progressing     Problem: Skin Injury Risk Increased  Goal: Skin Health and Integrity  Outcome: Progressing     Problem: Confusion Acute  Goal: Optimal Cognitive Function  Outcome: Progressing     Problem: Confusion Chronic  Goal: Optimal Cognitive Function  Outcome: Progressing     Problem: UTI (Urinary Tract Infection)  Goal: Improved Infection Symptoms  Outcome: Progressing     Problem: Comorbidity Management  Goal: Blood Pressure in Desired Range  Outcome: Progressing     Problem: Wound  Goal: Optimal Coping  Outcome: Progressing   Pt disoriented X4, arouses to voice. No indicators of pain. PO medication given crushed in apple sauce. Purewick in place, safety maintained.

## 2025-05-20 NOTE — PLAN OF CARE
The sw hard faxed([phone]) the pt's updates to Analy at [facility]([phone])and she confirmed she received it. The pt will return to her [facility] nh at d/c. The pt is still altered and having things ruled out. The sw will continue to follow the pt throughout her transitions of care and will assist with any d/c needs.     [facility] - Med Surg  Discharge Reassessment    Primary Care Provider: Brissa Mcduffie NP    Expected Discharge Date: 5/20/2025    Reassessment (most recent)       Discharge Reassessment - 05/20/25 1610          Discharge Reassessment    Assessment Type Discharge Planning Assessment (P)      Did the patient's condition or plan change since previous assessment? No (P)      Communicated NEERU with patient/caregiver Yes (P)      Discharge Plan A Return to nursing home (P)      Discharge Plan B Other (P)    TBD    DME Needed Upon Discharge  none (P)      Transition of Care Barriers None (P)      Why the patient remains in the hospital Requires continued medical care (P)         Post-Acute Status    Discharge Delays None known at this time (P)

## 2025-05-20 NOTE — PLAN OF CARE
Problem: Adult Inpatient Plan of Care  Goal: Plan of Care Review  Outcome: Progressing  Goal: Patient-Specific Goal (Individualized)  Outcome: Progressing  Goal: Absence of Hospital-Acquired Illness or Injury  Outcome: Progressing  Goal: Optimal Comfort and Wellbeing  Outcome: Progressing  Goal: Readiness for Transition of Care  Outcome: Progressing     Problem: Diabetes Comorbidity  Goal: Blood Glucose Level Within Targeted Range  Outcome: Progressing     Problem: Fall Injury Risk  Goal: Absence of Fall and Fall-Related Injury  Outcome: Progressing     Problem: Skin Injury Risk Increased  Goal: Skin Health and Integrity  Outcome: Progressing     Problem: Confusion Acute  Goal: Optimal Cognitive Function  Outcome: Progressing     Problem: Confusion Chronic  Goal: Optimal Cognitive Function  Outcome: Progressing     Problem: UTI (Urinary Tract Infection)  Goal: Improved Infection Symptoms  Outcome: Progressing     Problem: Comorbidity Management  Goal: Blood Pressure in Desired Range  Outcome: Progressing

## 2025-05-20 NOTE — PROGRESS NOTES
[facility] Medicine Progress Note    Primary Team: [facility] Hospitalist Team B  Attending Physician: Ricardo Crespo MD  Resident: Sandie  Intern: Lio    Subjective:      Patient sleeping in bed, woke up to sternal rubbing. Still not answering questions, blankly stares.      Objective:     Last 24 Hour Vital Signs:  BP  Min: 108/57  Max: 147/79  Temp  Av.1 °F (36.7 °C)  Min: 97.3 °F (36.3 °C)  Max: 98.7 °F (37.1 °C)  Pulse  Av.7  Min: 70  Max: 81  Resp  Av.3  Min: 16  Max: 18  SpO2  Av.3 %  Min: 95 %  Max: 96 %  I/O last 3 completed shifts:  In: 600 [P.O.:600]  Out: 550 [Urine:550]    Physical Examination:  Physical Exam  Constitutional:       General: She is not in acute distress.     Comments: Laying in bed on her side,  Not following commands   HENT:      Head: Normocephalic and atraumatic.   Eyes:      Extraocular Movements: Extraocular movements intact.      Conjunctiva/sclera: Conjunctivae normal.   Cardiovascular:      Rate and Rhythm: Normal rate and regular rhythm.   Pulmonary:      Effort: Pulmonary effort is normal. No respiratory distress.      Breath sounds: Normal breath sounds.   Abdominal:      General: Abdomen is flat.      Palpations: Abdomen is soft.   Musculoskeletal:      Comments: 2+ BLE edema up to mid shin  Right hand tremor   Skin:     Capillary Refill: Capillary refill takes less than 2 seconds.   Neurological:      Mental Status: unable to assess     Comments: not following commands. Arousable but did not want to participate.    Laboratory:  Laboratory Data Reviewed: yes    Microbiology Data Reviewed: yes  Pertinent Findings:  Urine culture (5/15): 100k Klebsiella pneumoniae    Other Results:  EKG (my interpretation): normal EKG, normal sinus rhythm, unchanged from previous tracings.    Current Medications:     Infusions:       Scheduled:   amLODIPine  5 mg Oral Nightly    atorvastatin  20 mg Oral QHS    cefTRIAXone (Rocephin) IV (PEDS and ADULTS)  1 g Intravenous  Q24H    donepeziL  5 mg Oral Daily    enoxparin  40 mg Subcutaneous Daily        PRN:    Current Facility-Administered Medications:     dextrose 50%, 12.5 g, Intravenous, PRN    dextrose 50%, 25 g, Intravenous, PRN    glucagon (human recombinant), 1 mg, Intramuscular, PRN    glucose, 16 g, Oral, PRN    glucose, 24 g, Oral, PRN    insulin aspart U-100, 0-5 Units, Subcutaneous, QID (AC + HS) PRN    melatonin, 6 mg, Oral, Nightly PRN    sodium chloride 0.9%, 10 mL, Intravenous, Q12H PRN    Antibiotics and Day Number of Therapy:  CTX (5/15-present)    Assessment:     Milagro Miller is a 89 y.o. female with a PMHx of HTN, dementia, HLD, T2DM presenting with acute encephalopathy. Patient admitted to [facility] for acute encephalopathy 2/2 to UTI.    Plan:     Acute Encephalopathy  Urinary Tract Infection 2/2 Klebsiella pneumoniae   - Patient less responsive this morning per nursing home  - UA 3+ LE, >100 WBC, occasional bacteria, 2+ blood. Urine culture in process  - WBC wnl, TSH wnl, afebrile  - CT Head with no intracranial hemorrhage; has remote small infarcts  - CXR with bibasilar atelectasis, no focal consolidation  - PT/OT/SLP  - Urine culture growing K. Pneumoniae, mostly pan sensitive    - CTX 5/7 days completed for UTI     HTN  - BP on presentation 114/57  - Continue home amlodipine 5mg      Dementia  Parkinson's like tremor  - Per family, patient is at her baseline but less cooperative than usual  - Continue home donepezil 5mg  - Follow with OP neurology      HLD  - Continue home statin     T2DM  - Last A1c 7.8% 6 years ago, repeat 8.9  - Home regimen: Lantus 10u nightly  - LDSSI while inpatient      PPx: Lovenox  Diet: Consistent Carbohydrate  Code: Full     Disposition: pending treatment of UTI and resolution of encephalopathy     Radha Vaca MD  [facility] PGY-1

## 2025-05-20 NOTE — NURSING
Assumed care of patient from MEÑO Hoffmann, patient is DOX4, room air, vitals WNL, bedrest, PW in place, thickener needed for liquids, swallows one pill at a time for the small ones, please crush larger pills, redness to sacrum, TRIAD and MARA, Q2 turns, waffle mattress, z flex boots, all safety precautions are in place, call bell and tray table are within reach of the patient and no additional questions or concerns from the patient at this time.

## 2025-05-21 VITALS
OXYGEN SATURATION: 98 % | TEMPERATURE: 98 F | BODY MASS INDEX: 25.58 KG/M2 | HEART RATE: 77 BPM | HEIGHT: 66 IN | RESPIRATION RATE: 20 BRPM | WEIGHT: 159.19 LBS | SYSTOLIC BLOOD PRESSURE: 128 MMHG | DIASTOLIC BLOOD PRESSURE: 78 MMHG

## 2025-05-21 PROBLEM — G20.B2 PARKINSON'S DISEASE WITH DYSKINESIA AND FLUCTUATING MANIFESTATIONS: Status: ACTIVE | Noted: 2025-05-21

## 2025-05-21 LAB
ABSOLUTE EOSINOPHIL (OHS): 0.1 K/UL
ABSOLUTE MONOCYTE (OHS): 0.67 K/UL (ref 0.3–1)
ABSOLUTE NEUTROPHIL COUNT (OHS): 2.55 K/UL (ref 1.8–7.7)
ALBUMIN SERPL BCP-MCNC: 2.9 G/DL (ref 3.5–5.2)
ALP SERPL-CCNC: 55 UNIT/L (ref 40–150)
ALT SERPL W/O P-5'-P-CCNC: <5 UNIT/L (ref 10–44)
ANION GAP (OHS): 6 MMOL/L (ref 8–16)
AST SERPL-CCNC: 13 UNIT/L (ref 11–45)
BASOPHILS # BLD AUTO: 0.02 K/UL
BASOPHILS NFR BLD AUTO: 0.4 %
BILIRUB SERPL-MCNC: 0.5 MG/DL (ref 0.1–1)
BUN SERPL-MCNC: 18 MG/DL (ref 8–23)
CALCIUM SERPL-MCNC: 9.4 MG/DL (ref 8.7–10.5)
CHLORIDE SERPL-SCNC: 102 MMOL/L (ref 95–110)
CO2 SERPL-SCNC: 29 MMOL/L (ref 23–29)
CREAT SERPL-MCNC: 0.7 MG/DL (ref 0.5–1.4)
ERYTHROCYTE [DISTWIDTH] IN BLOOD BY AUTOMATED COUNT: 14.6 % (ref 11.5–14.5)
GFR SERPLBLD CREATININE-BSD FMLA CKD-EPI: >60 ML/MIN/1.73/M2
GLUCOSE SERPL-MCNC: 166 MG/DL (ref 70–110)
HCT VFR BLD AUTO: 36.2 % (ref 37–48.5)
HGB BLD-MCNC: 11.6 GM/DL (ref 12–16)
IMM GRANULOCYTES # BLD AUTO: 0.01 K/UL (ref 0–0.04)
IMM GRANULOCYTES NFR BLD AUTO: 0.2 % (ref 0–0.5)
LYMPHOCYTES # BLD AUTO: 1.12 K/UL (ref 1–4.8)
MAGNESIUM SERPL-MCNC: 1.8 MG/DL (ref 1.6–2.6)
MCH RBC QN AUTO: 28.2 PG (ref 27–31)
MCHC RBC AUTO-ENTMCNC: 32 G/DL (ref 32–36)
MCV RBC AUTO: 88 FL (ref 82–98)
NUCLEATED RBC (/100WBC) (OHS): 0 /100 WBC
PLATELET # BLD AUTO: 193 K/UL (ref 150–450)
PMV BLD AUTO: 11.6 FL (ref 9.2–12.9)
POCT GLUCOSE: 175 MG/DL (ref 70–110)
POCT GLUCOSE: 216 MG/DL (ref 70–110)
POCT GLUCOSE: 222 MG/DL (ref 70–110)
POTASSIUM SERPL-SCNC: 3.7 MMOL/L (ref 3.5–5.1)
PROT SERPL-MCNC: 7.1 GM/DL (ref 6–8.4)
RBC # BLD AUTO: 4.12 M/UL (ref 4–5.4)
RELATIVE EOSINOPHIL (OHS): 2.2 %
RELATIVE LYMPHOCYTE (OHS): 25.1 % (ref 18–48)
RELATIVE MONOCYTE (OHS): 15 % (ref 4–15)
RELATIVE NEUTROPHIL (OHS): 57.1 % (ref 38–73)
SODIUM SERPL-SCNC: 137 MMOL/L (ref 136–145)
WBC # BLD AUTO: 4.47 K/UL (ref 3.9–12.7)

## 2025-05-21 PROCEDURE — 63600175 PHARM REV CODE 636 W HCPCS

## 2025-05-21 PROCEDURE — 25000003 PHARM REV CODE 250

## 2025-05-21 PROCEDURE — 36415 COLL VENOUS BLD VENIPUNCTURE: CPT

## 2025-05-21 PROCEDURE — 85025 COMPLETE CBC W/AUTO DIFF WBC: CPT

## 2025-05-21 PROCEDURE — 82040 ASSAY OF SERUM ALBUMIN: CPT

## 2025-05-21 PROCEDURE — 83735 ASSAY OF MAGNESIUM: CPT

## 2025-05-21 RX ORDER — ALENDRONATE SODIUM 70 MG/1
70 TABLET ORAL
Start: 2025-05-25

## 2025-05-21 RX ORDER — INSULIN ASPART 100 [IU]/ML
0-5 INJECTION, SOLUTION INTRAVENOUS; SUBCUTANEOUS
Start: 2025-05-21 | End: 2026-05-21

## 2025-05-21 RX ORDER — NAPROXEN SODIUM 220 MG/1
81 TABLET, FILM COATED ORAL DAILY
Start: 2025-05-22 | End: 2026-05-22

## 2025-05-21 RX ORDER — NAPROXEN SODIUM 220 MG/1
81 TABLET, FILM COATED ORAL DAILY
Status: DISCONTINUED | OUTPATIENT
Start: 2025-05-21 | End: 2025-05-21 | Stop reason: HOSPADM

## 2025-05-21 RX ORDER — CARBIDOPA AND LEVODOPA 10; 100 MG/1; MG/1
1 TABLET ORAL 3 TIMES DAILY
Start: 2025-05-21 | End: 2026-05-21

## 2025-05-21 RX ADMIN — INSULIN ASPART 2 UNITS: 100 INJECTION, SOLUTION INTRAVENOUS; SUBCUTANEOUS at 11:05

## 2025-05-21 RX ADMIN — ENOXAPARIN SODIUM 40 MG: 40 INJECTION SUBCUTANEOUS at 04:05

## 2025-05-21 RX ADMIN — CARBIDOPA AND LEVODOPA 1 TABLET: 10; 100 TABLET ORAL at 03:05

## 2025-05-21 RX ADMIN — ASPIRIN 81 MG CHEWABLE TABLET 81 MG: 81 TABLET CHEWABLE at 11:05

## 2025-05-21 RX ADMIN — CEFTRIAXONE SODIUM 1 G: 1 INJECTION, POWDER, FOR SOLUTION INTRAMUSCULAR; INTRAVENOUS at 03:05

## 2025-05-21 RX ADMIN — INSULIN ASPART 2 UNITS: 100 INJECTION, SOLUTION INTRAVENOUS; SUBCUTANEOUS at 04:05

## 2025-05-21 RX ADMIN — CARBIDOPA AND LEVODOPA 1 TABLET: 10; 100 TABLET ORAL at 08:05

## 2025-05-21 NOTE — PLAN OF CARE
Problem: Adult Inpatient Plan of Care  Goal: Plan of Care Review  Outcome: Progressing  Goal: Patient-Specific Goal (Individualized)  Outcome: Progressing  Goal: Absence of Hospital-Acquired Illness or Injury  Outcome: Progressing  Goal: Optimal Comfort and Wellbeing  Outcome: Progressing  Goal: Readiness for Transition of Care  Outcome: Progressing     Problem: Diabetes Comorbidity  Goal: Blood Glucose Level Within Targeted Range  Outcome: Progressing     Problem: Fall Injury Risk  Goal: Absence of Fall and Fall-Related Injury  Outcome: Progressing     Problem: Skin Injury Risk Increased  Goal: Skin Health and Integrity  Outcome: Progressing     Problem: Confusion Acute  Goal: Optimal Cognitive Function  Outcome: Progressing     Problem: Confusion Chronic  Goal: Optimal Cognitive Function  Outcome: Progressing     Problem: UTI (Urinary Tract Infection)  Goal: Improved Infection Symptoms  Outcome: Progressing     Problem: Comorbidity Management  Goal: Blood Pressure in Desired Range  Outcome: Progressing     Problem: Wound  Goal: Optimal Coping  Outcome: Progressing  Goal: Optimal Functional Ability  Outcome: Progressing  Goal: Absence of Infection Signs and Symptoms  Outcome: Progressing  Goal: Improved Oral Intake  Outcome: Progressing  Goal: Optimal Pain Control and Function  Outcome: Progressing  Goal: Skin Health and Integrity  Outcome: Progressing  Goal: Optimal Wound Healing  Outcome: Progressing

## 2025-05-21 NOTE — PROGRESS NOTES
The pt will return to [facility]. The sw gave the pt's nurse the contact info to call report. The pt's niece Merna was left a voice message informing her of the transport time. The pt will d/c today.     [facility] - Med Surg  Discharge Final Note    Primary Care Provider: Brissa Mcduffie NP    Expected Discharge Date: 5/21/2025    Final Discharge Note (most recent)       Final Note - 05/21/25 1131          Post-Acute Status    Post-Acute Authorization Placement     Post-Acute Placement Status Set-up Complete/Auth obtained     Discharge Delays None known at this time                     Important Message from Medicare  Important Message from Medicare regarding Discharge Appeal Rights: Other (comments) (Unable to sign due to medical condition.)     Date IMM was signed: 05/19/25  Time IMM was signed: 1044    Contact Info       [facility]   Specialty: Neurology    [address]    [place] [address]   Phone: [phone]       Next Steps: Follow up    Instructions: The sw requested a hospital follow up with the clinic listed above. The staff will call the nh with the appt.    [facility] half-way   Specialty: Skilled Nursing Facility    [address] [place] [address]   Phone: [phone]       Next Steps: Follow up    Instructions: The pt will return to her half-way nh listed above.

## 2025-05-21 NOTE — PLAN OF CARE
Oriented to self. Tolerating diabetic, minced & moist diet. IV ABX given per MAR. Incontinence care provided. Blood glucose monitoring maintained. Bed locked in lowest position, bed alarm set and call bell within reach.       Problem: Adult Inpatient Plan of Care  Goal: Plan of Care Review  Outcome: Progressing

## 2025-05-21 NOTE — DISCHARGE SUMMARY
Acute respiratory failure with hypoxia and hypercapnia [facility] Hospital Medicine Discharge Summary    Primary Team: [facility] Hospitalist Team B  Attending Physician: Ricardo Crespo MD  Resident: MD Sandie  Intern: MD Lio    Date of Admit: 5/15/2025  Date of Discharge: 5/21/2025    Discharge to: Back to Nursing Home  Condition: Stable    Discharge Diagnoses     Acute Encephalopathy  Urinary Tract Infection 2/2 Klebsiella pneumoniae   HTN   Dementia  Parkinson's like tremor, increased tone   HLD   T2DM     Consultants and Procedures     Consultants:  Neurology: Vitor Bruce MD    Procedures:   None    Brief History of Present Illness      HPI:  Milagro Miller is a 89 y.o. female with a PMHx of HTN, dementia, HLD, T2DM. The patient presented on 5/15/2025 for evaluation of acute encephalopathy.     History limited as patient unable to provide history due to mental status. Patient's family called to obtain additional history.     Patient lives at [facility]. Nursing home called EMS today because patient was less verbal and responsive than usual. Per family, this is her baseline but less cooperative than usual. EMS noted lower extremity edema, blood glucose within normal limits. Patient has a history of dementia.     For the full HPI please refer to the History & Physical from this admission.    Hospital Course By Problem with Pertinent Findings     Acute Encephalopathy  Urinary Tract Infection 2/2 Klebsiella pneumoniae   Patient less responsive per nursing home. UA 3+ LE, >100 WBC, occasional bacteria, 2+ blood. Urine culture growing K. Pneumoniae, mostly pan-sensitive. Completed 7 days CTX treatment. CT Head with no intracranial hemorrhage; has remote small infarcts. CXR with bibasilar atelectasis, no focal consolidation. Patient's mental status still had not improved 6 days into antibiotic treatment; started Sinemet 5/20 due to concern for Parkinson's symptoms, patient's mental status greatly improved the next morning. Appears at baseline which is alert and oriented  to herself only, per nursing home. Ready for discharge back to her nursing home.     HTN  BP on presentation 114/57. Continued home amlodipine 5mg      Dementia  Parkinson's like tremor, increased tone   Held home donepezil. Started patient on Sinemet to assess for improvement, she appeared at baseline the next morning. Neuro consulted- outpatient follow-up for official diagnosis. ASA daily for secondary stroke prevention. Continue sinemet 10-100mg TID. Discontinue Rexulti.      HLD  Continued home statin     T2DM  A1c 8.9%. Home regimen: Lantus 10u nightly. LDSSI while inpatient. Will discharge on sliding scale since patient's sugars were well controlled on SSI without Lantus. Can add back Lantus if sugars elevated.    Discharge Medications        Medication List        PAUSE taking these medications      LANTUS SOLOSTAR U-100 INSULIN 100 unit/mL (3 mL) Inpn pen  Wait to take this until your doctor or other care provider tells you to start again.  Generic drug: insulin glargine U-100 (Lantus)     LORazepam 1 MG tablet  Wait to take this until your doctor or other care provider tells you to start again.  Commonly known as: ATIVAN     metFORMIN 500 MG tablet  Wait to take this until your doctor or other care provider tells you to start again.  Commonly known as: GLUCOPHAGE  Take 2 tablets (1,000 mg total) by mouth 2 (two) times daily with meals.            START taking these medications      aspirin 81 MG Chew  Take 1 tablet (81 mg total) by mouth once daily.  Start taking on: May 22, 2025     carbidopa-levodopa  mg  mg per tablet  Commonly known as: SINEMET  Take 1 tablet by mouth 3 (three) times daily.     insulin aspart U-100 100 unit/mL (3 mL) Inpn pen  Commonly known as: NovoLOG  Inject 0-5 Units into the skin before meals and at bedtime as needed (Hyperglycemia).            CHANGE how you take these medications      simvastatin 20 MG tablet  Commonly known as: ZOCOR  TAKE ONE TABLET BY MOUTH EVERY  "EVENING  What changed:   how much to take  how to take this  when to take this  additional instructions            CONTINUE taking these medications      alendronate 70 MG tablet  Commonly known as: FOSAMAX  Take 1 tablet (70 mg total) by mouth every Sunday. 30MIN BEFORE FOOD W/8OZ WATER,STAY UPRIGHT FOR 30MIN  Start taking on: May 25, 2025     amLODIPine 5 MG tablet  Commonly known as: NORVASC     BD AUTOSHIELD DUO PEN NEEDLE 30 gauge x 3/16" Ndle  Generic drug: pen needle,diabetic dual safty     melatonin 3 mg tablet  Commonly known as: MELATIN            STOP taking these medications      donepeziL 5 MG tablet  Commonly known as: ARICEPT               Where to Get Your Medications        Information about where to get these medications is not yet available    Ask your nurse or doctor about these medications  alendronate 70 MG tablet  aspirin 81 MG Chew  carbidopa-levodopa  mg  mg per tablet  insulin aspart U-100 100 unit/mL (3 mL) Inpn pen         Discharge Information:   Diet:  Diabetic    Physical Activity:  As tolerated by patient.             Instructions:  1. Take all medications as prescribed  2. Keep all follow-up appointments  3. Return to the hospital or call your primary care physicians if any worsening symptoms such as fever, chest pain, shortness of breath, return of symptoms, or any other concerns.    Follow-Up Appointments:  PCP  Neurology    Radha Vaca MD   [facility] PGY-1   " Acute respiratory failure with hypoxia and hypercapnia Acute respiratory failure with hypoxia and hypercapnia Acute respiratory failure with hypoxia and hypercapnia Acute respiratory failure with hypoxia and hypercapnia Acute respiratory failure with hypoxia and hypercapnia

## 2025-05-21 NOTE — PLAN OF CARE
The sw was notified by the team that the pt will d/c today back to [facility] [phone]. The sw spoke to the pt's niece Merna [phone] and notified her of this info. The team states they will call Merna also to notify her. The sw spoke to Chastity in Admissions at the pt's nh and hard faxed her the pt's info to her at [phone] b/c her other fax hasn't been working. The sw will place the pt's copied chart in her cubby and fax the d/c orders once completed. The sw sent an IB to [facility] Neurology for a hsp f/u.     1:23pm The sw hard faxed the pt's d/c orders to [facility] at [phone] and she confirmed she received them. She gave the pt permission to request ambulance transport. The sw requested a 2:30pm ambulance transport. The sw placed the copied packet in the pt's cubby and will give the pt's nurse the contact info to call report once approved by Chastity.    1:39pm The pt was cleared to return by Chastity. The sw gave the pt's nurse the contact info to call report to the [address] [place] [address] nurse. The sw left a message for the pt's niece of there info mentioned above. The pt's ETA is   5/21/2025 5:35pm. The pt's copied chart is in the cubby.           05/21/25 1131   Post-Acute Status   Post-Acute Authorization Placement   Post-Acute Placement Status Set-up Complete/Auth obtained   Discharge Delays None known at this time   Discharge Plan   Discharge Plan A Return to nursing home   Discharge Plan B Skilled Nursing Facility

## 2025-05-21 NOTE — PROGRESS NOTES
[facility] Medicine Progress Note    Primary Team: [facility] Hospitalist Team B  Attending Physician: Ricardo Crespo MD  Resident: Sandie  Intern: Lio    Subjective:      Patient much improved this morning, she is awake and oriented to herself, which is her baseline per nursing home. Started Sinemet yesterday.     Objective:     Last 24 Hour Vital Signs:  BP  Min: 114/59  Max: 130/61  Temp  Av.1 °F (36.7 °C)  Min: 97.7 °F (36.5 °C)  Max: 98.8 °F (37.1 °C)  Pulse  Av.8  Min: 67  Max: 74  Resp  Av.4  Min: 18  Max: 20  SpO2  Av.6 %  Min: 97 %  Max: 98 %  I/O last 3 completed shifts:  In: 930 [P.O.:930]  Out: 650 [Urine:650]    Physical Examination:  Physical Exam  Constitutional:       General: She is not in acute distress.     Comments: Awake, alert  HENT:      Head: Normocephalic and atraumatic.   Eyes:      Extraocular Movements: Extraocular movements intact.      Conjunctiva/sclera: Conjunctivae normal.   Cardiovascular:      Rate and Rhythm: Normal rate and regular rhythm.   Pulmonary:      Effort: Pulmonary effort is normal. No respiratory distress.      Breath sounds: Normal breath sounds.   Abdominal:      General: Abdomen is flat.      Palpations: Abdomen is soft.   Musculoskeletal:      Comments: 2+ BLE edema up to mid shin  Right hand tremor   Skin:     Capillary Refill: Capillary refill takes less than 2 seconds.   Neurological:      Mental Status: unable to assess     Comments: Alert and oriented to self, but not place or time, which is patient's baseline per NH.     Laboratory:  Laboratory Data Reviewed: yes    Microbiology Data Reviewed: yes  Pertinent Findings:  Urine culture (5/15): 100k Klebsiella pneumoniae    Other Results:  EKG (my interpretation): normal EKG, normal sinus rhythm, unchanged from previous tracings.    Current Medications:     Infusions:       Scheduled:   amLODIPine  5 mg Oral Nightly    atorvastatin  20 mg Oral QHS    carbidopa-levodopa  mg  1 tablet  Oral TID    cefTRIAXone (Rocephin) IV (PEDS and ADULTS)  1 g Intravenous Q24H    enoxparin  40 mg Subcutaneous Daily        PRN:    Current Facility-Administered Medications:     dextrose 50%, 12.5 g, Intravenous, PRN    dextrose 50%, 25 g, Intravenous, PRN    glucagon (human recombinant), 1 mg, Intramuscular, PRN    glucose, 16 g, Oral, PRN    glucose, 24 g, Oral, PRN    insulin aspart U-100, 0-5 Units, Subcutaneous, QID (AC + HS) PRN    melatonin, 6 mg, Oral, Nightly PRN    sodium chloride 0.9%, 10 mL, Intravenous, Q12H PRN    Antibiotics and Day Number of Therapy:  CTX (5/15-present)    Assessment:     Milagro Miller is a 89 y.o. female with a PMHx of HTN, dementia, HLD, T2DM presenting with acute encephalopathy. Patient admitted to [facility] for acute encephalopathy 2/2 to UTI.    Plan:     Acute Encephalopathy  Urinary Tract Infection 2/2 Klebsiella pneumoniae   - Patient less responsive this morning per nursing home  - UA 3+ LE, >100 WBC, occasional bacteria, 2+ blood. Urine culture in process  - WBC wnl, TSH wnl, afebrile  - CT Head with no intracranial hemorrhage; has remote small infarcts  - CXR with bibasilar atelectasis, no focal consolidation  - PT/OT/SLP  - Urine culture growing K. Pneumoniae, mostly pan sensitive    - CTX 6/7 days completed for UTI  - Started Sinemet 5/20, patient's mental status now improved this morning, appears at baseline which is alert and oriented to herself only, per nursing home.      HTN  - BP on presentation 114/57  - Continue home amlodipine 5mg      Dementia  Parkinson's like tremor, increased tone   - Per family, patient is at her baseline but less cooperative than usual  - Holding home donepezil   - Started patient on Sinemet to assess for improvement, she appears more at baseline this morning  - Neuro consulted- outpatient follow-up for official diagnosis. ASA for secondary stroke prevention. Continue sinemet 10-100mg TID.      HLD  - Continue home statin      T2DM  - Last A1c 7.8% 6 years ago, repeat 8.9  - Home regimen: Lantus 10u nightly  - LDSSI while inpatient      PPx: Lovenox  Diet: Consistent Carbohydrate  Code: Full     Disposition: pending improvement in encephalopathy     Radha Vaca MD  [facility] PGY-1

## 2025-05-21 NOTE — PROGRESS NOTES
[facility]    FACILITY TRANSFER ORDERS      Patient Name: Milagro Miller  YOB: 1935    PCP: Brissa Mcduffie NP   PCP Address: [address] / [place] [address]  PCP Phone Number: [phone]  PCP Fax: [phone]    Encounter Date: 05/21/2025    Admit to: nursing home ([facility])    Vital Signs:  Routine    Diagnoses:   Active Hospital Problems    Diagnosis  POA    *Urinary tract infection without hematuria [N39.0]  Yes    Parkinson's disease with dyskinesia and fluctuating manifestations [G20.B2]  Yes    Altered mental status [R41.82]  Yes    Diabetes mellitus, type 2 [E11.9]  Yes    HTN (hypertension) [I10]  Yes      Resolved Hospital Problems   No resolved problems to display.       Allergies:  Review of patient's allergies indicates:   Allergen Reactions    Mold extracts Other (See Comments)     cough    Ragweed Other (See Comments)     redness to eyes       Diet: diabetic diet: 2000 calorie    Activities: Activity as tolerated    Goals of Care Treatment Preferences:  Code Status: Full Code      Nursing: facility dictated    Labs: facility dictated    CONSULTS:    None placed  Referral for outpatient neurology placed    MISCELLANEOUS CARE:  N/a    WOUND CARE ORDERS  N/a    Medications: Review discharge medications with patient and family and provide education.         Medication List        PAUSE taking these medications      LANTUS SOLOSTAR U-100 INSULIN 100 unit/mL (3 mL) Inpn pen  Wait to take this until your doctor or other care provider tells you to start again.  Generic drug: insulin glargine U-100 (Lantus)  Inject 10 Units into the skin every evening.     LORazepam 1 MG tablet  Wait to take this until your doctor or other care provider tells you to start again.  Commonly known as: ATIVAN  Take 1 mg by mouth 2 (two) times daily.     metFORMIN 500 MG tablet  Wait to take this until your doctor or other care provider tells you to start again.  Commonly known  "as: GLUCOPHAGE  Take 2 tablets (1,000 mg total) by mouth 2 (two) times daily with meals.            START taking these medications      aspirin 81 MG Chew  Take 1 tablet (81 mg total) by mouth once daily.  Start taking on: May 22, 2025     carbidopa-levodopa  mg  mg per tablet  Commonly known as: SINEMET  Take 1 tablet by mouth 3 (three) times daily.     insulin aspart U-100 100 unit/mL (3 mL) Inpn pen  Commonly known as: NovoLOG  Inject 0-5 Units into the skin before meals and at bedtime as needed (Hyperglycemia).            CHANGE how you take these medications      simvastatin 20 MG tablet  Commonly known as: ZOCOR  TAKE ONE TABLET BY MOUTH EVERY EVENING  What changed:   how much to take  how to take this  when to take this  additional instructions            CONTINUE taking these medications      alendronate 70 MG tablet  Commonly known as: FOSAMAX  Take 1 tablet (70 mg total) by mouth every Sunday. 30MIN BEFORE FOOD W/8OZ WATER,STAY UPRIGHT FOR 30MIN  Start taking on: May 25, 2025     amLODIPine 5 MG tablet  Commonly known as: NORVASC  Take 5 mg by mouth nightly.      AUTOSHIELD DUO PEN NEEDLE 30 gauge x 3/16" Ndle  Generic drug: pen needle,diabetic dual safty     melatonin 3 mg tablet  Commonly known as: MELATIN  Take 3 mg by mouth nightly.            STOP taking these medications      donepeziL 5 MG tablet  Commonly known as: ARICEPT                Immunizations Administered as of 5/21/2025       No immunizations on file.          Some patients may experience side effects after vaccination.  These may include fever, headache, muscle or joint aches.  Most symptoms resolve with 24-48 hours and do not require urgent medical evaluation unless they persist for more than 72 hours or symptoms are concerning for an unrelated medical condition.          _________________________________  Flores Hooper MD  05/21/2025          "

## 2025-05-21 NOTE — NURSING
Assumed care of patient from ALIA Smith, patient is DOX4, room air, vitals WNL, bedrest, PW in place, thickener needed for liquids, swallows one pill at a time for the small ones, please crush larger pills, redness to sacrum, TRIAD and MARA, Q2 turns, waffle mattress, z flex boots, all safety precautions are in place, call bell and tray table are within reach of the patient and no additional questions or concerns from the patient at this time.

## 2025-06-19 PROBLEM — G93.40 ACUTE ENCEPHALOPATHY: Status: ACTIVE | Noted: 2025-06-19

## 2025-07-02 ENCOUNTER — TELEPHONE (OUTPATIENT)
Facility: CLINIC | Age: 89
End: 2025-07-02
Payer: MEDICARE

## 2025-08-04 PROBLEM — G93.40 ACUTE ENCEPHALOPATHY: Status: RESOLVED | Noted: 2025-06-19 | Resolved: 2025-08-04

## 2025-08-08 ENCOUNTER — OFFICE VISIT (OUTPATIENT)
Facility: CLINIC | Age: 89
End: 2025-08-08
Payer: MEDICARE

## 2025-08-08 VITALS — DIASTOLIC BLOOD PRESSURE: 78 MMHG | SYSTOLIC BLOOD PRESSURE: 115 MMHG | HEART RATE: 77 BPM

## 2025-08-08 DIAGNOSIS — G30.9 SEVERE ALZHEIMER'S DEMENTIA, UNSPECIFIED TIMING OF DEMENTIA ONSET, UNSPECIFIED WHETHER BEHAVIORAL, PSYCHOTIC, OR MOOD DISTURBANCE OR ANXIETY: Primary | ICD-10-CM

## 2025-08-08 DIAGNOSIS — F02.C0 SEVERE ALZHEIMER'S DEMENTIA, UNSPECIFIED TIMING OF DEMENTIA ONSET, UNSPECIFIED WHETHER BEHAVIORAL, PSYCHOTIC, OR MOOD DISTURBANCE OR ANXIETY: Primary | ICD-10-CM

## 2025-08-08 PROCEDURE — 99999 PR PBB SHADOW E&M-EST. PATIENT-LVL III: CPT | Mod: PBBFAC,,, | Performed by: PSYCHIATRY & NEUROLOGY

## 2025-08-08 NOTE — PROGRESS NOTES
MOVEMENT DISORDERS CLINIC NEW CONSULT NOTE    PCP/Referring Provider:   Flores Hooper MD  [address] [place] [address]  Date of Service: 8/8/2025    Chief Complaint: possible PD    HPI: Milagro Miller is a 89 y.o. female presenting for evaluation.    Patient presents for evaluation of confusion and a reported Parkinson's-like tremor.    HPI:  Patient is a long-term nursing home resident who was recently hospitalized for confusion. She has been in the nursing home for approximately 7 years and has been unable to take care of herself during this time. She has a history of ongoing confusion.  In May 2025, she was noted to have a Parkinson's-like tremor, though this is not clearly visible on current exam. At that time, she was started on Sinemet (levodopa) for presumed Parkinson's disease. Her current state is characterized by severe dementia with minimal interaction. She is mostly non-verbal and does not follow commands. When she does speak, she tends to repeat phrases indicating concern about back pain. She requires assistance with eating and sometimes needs reminders to swallow. She spends time sitting in the hallway of the nursing home but does not actively engage with her surroundings. She has discomfort during van rides, expressing concern about back pain when the vehicle hits bumps.    She denies being angry, irritable, or combative during her recent hospitalization. She denies hallucinations.      ROS:  Gastrointestinal: +difficulty swallowing  Musculoskeletal: +pain with movement, +limited movement  Neurological: +speech difficulty, +confusion          Current Medications:  Encounter Medications[1]    Past Medical History:  Problem List[2]    Past Surgical History:  Past Surgical History:   Procedure Laterality Date    HYSTERECTOMY         Social:  Social History[3]    Family History:  Family History   Problem Relation Name Age of Onset    No Known Problems Mother      No Known  "Problems Father         PHYSICAL:  /78   Pulse 77     General Medical Examination:  General: Good hygiene, appropriate appearance.  HEENT: Normocephalic, atraumatic.     Mental Status:  Level of consciousness: Awake, not alert  Orientation: Non verbal  Language: Non verbal does not follow commands    Wheelchair bound  Nonverbal for me today  [place]     Laboratory Data:    Lab Results   Component Value Date    TSH 2.123 05/15/2025     No results found for: "CIBKEHIP60"      Imaging:  No results found for this or any previous visit (from the past 2160 hours).    Assessment//Plan:   Problem List Items Addressed This Visit    None  Visit Diagnoses         Severe Alzheimer's dementia, unspecified timing of dementia onset, unspecified whether behavioral, psychotic, or mood disturbance or anxiety    -  Primary            Assessment & Plan    SEVERE ALZHEIMER'S DEMENTIA, UNSPECIFIED TIMING OF DEMENTIA ONSET, UNSPECIFIED WHETHER BEHAVIORAL, PSYCHOTIC, OR MOOD DISTURBANCE OR ANXIETY:  - Patient presents with significant dementia, impaired in instrumental activities of daily living for at least 7 years.  - Non-verbal, not following commands, and minimally interactive.  - CT head from May 2025 shows significant atrophy of bilateral temporal lobes with enlarged temporal horns, potentially encephalomalacia in anterior thalamus.  - Assessment: Most likely severe, advanced Alzheimer's disease vs. possibly vascular dementia related to dysfunction in anterior thalamus and [place] circuit.  - Discontinued levodopa/Sinemet due to lack of clear benefit and potential for increased side effects including hallucinations and delirium.    PLAN SUMMARY:  - Consider neurology referral as needed  - Discontinued levodopa/Sinemet due to lack of benefit and potential side effects  - Assess for severe, advanced Alzheimer's disease vs. possible vascular dementia               This note was generated with the assistance of ambient listening " "technology. Verbal consent was obtained by the patient and accompanying visitor(s) for the recording of patient appointment to facilitate this note. I attest to having reviewed and edited the generated note for accuracy, though some syntax or spelling errors may persist. Please contact the author of this note for any clarification.     The patient was seen for follow-up evaluation of severe dementia with prior report of Parkinsonian tremor. Medical decision making was of moderate complexity due to advanced cognitive impairment, need to differentiate Alzheimers disease from vascular dementia, and medication management changes. Review of prior imaging revealed significant temporal lobe atrophy and possible anterior thalamic injury affecting the [place] circuit. Medication reconciliation and management included discontinuation of carbidopa-levodopa due to lack of observed benefit and risk of neuropsychiatric side effects in the setting of severe dementia. Functional status remains profoundly impaired, requiring total assistance with all ADLs and posing high risk for further decline. This visit required integration of prior history, neurologic exam adapted for nonverbal status, and coordination of ongoing long-term care needs.    Brody Martinez MD  [facility]  Department of Neurology  Movement Disorders           [1]   Outpatient Encounter Medications as of 8/8/2025   Medication Sig Dispense Refill    alendronate (FOSAMAX) 70 MG tablet Take 1 tablet (70 mg total) by mouth every Sunday. 30MIN BEFORE FOOD W/8OZ WATER,STAY UPRIGHT FOR 30MIN      amLODIPine (NORVASC) 5 MG tablet Take 5 mg by mouth nightly.      aspirin 81 MG Chew Take 1 tablet (81 mg total) by mouth once daily.      BD AUTOSHIELD DUO PEN NEEDLE 30 gauge x 3/16" Ndle       insulin aspart U-100 (NOVOLOG) 100 unit/mL (3 mL) InPn pen Inject 0-5 Units into the skin before meals and at bedtime as needed (Hyperglycemia).      [Paused] LANTUS SOLOSTAR U-100 " 5 INSULIN 100 unit/mL (3 mL) InPn pen Inject 10 Units into the skin every evening.      [Paused] LORazepam (ATIVAN) 1 MG tablet Take 1 mg by mouth 2 (two) times daily.      melatonin (MELATIN) 3 mg tablet Take 3 mg by mouth nightly.      [Paused] metFORMIN (GLUCOPHAGE) 500 MG tablet Take 2 tablets (1,000 mg total) by mouth 2 (two) times daily with meals. (Patient not taking: Reported on 5/15/2025) 360 tablet 3    simvastatin (ZOCOR) 20 MG tablet TAKE ONE TABLET BY MOUTH EVERY EVENING (Patient taking differently: Take 20 mg by mouth every evening.) 30 tablet 11    [DISCONTINUED] carbidopa-levodopa  mg (SINEMET)  mg per tablet Take 1 tablet by mouth 3 (three) times daily.       No facility-administered encounter medications on file as of 8/8/2025.   [2]   Patient Active Problem List  Diagnosis    Osteoarthritis    Diabetes mellitus, type 2    HTN (hypertension)    Osteoporosis    HLD (hyperlipidemia)    Urinary tract infection without hematuria    Altered mental status    Parkinson's disease with dyskinesia and fluctuating manifestations   [3]   Social History  Socioeconomic History    Marital status: Single   Tobacco Use    Smoking status: Former     Current packs/day: 0.05     Average packs/day: 0.1 packs/day for 15.0 years (0.8 ttl pk-yrs)     Types: Cigarettes    Smokeless tobacco: Former   Substance and Sexual Activity    Alcohol use: No    Drug use: No     Social Drivers of Health     Financial Resource Strain: Patient Unable To Answer (5/16/2025)    Overall Financial Resource Strain (CARDIA)     Difficulty of Paying Living Expenses: Patient unable to answer   Food Insecurity: Patient Unable To Answer (5/16/2025)    Hunger Vital Sign     Worried About Running Out of Food in the Last Year: Patient unable to answer     Ran Out of Food in the Last Year: Patient unable to answer   Transportation Needs: Patient Unable To Answer (5/16/2025)    PRAPARE - Transportation     Lack of Transportation (Medical):  Patient unable to answer     Lack of Transportation (Non-Medical): Patient unable to answer   Physical Activity: Patient Unable To Answer (5/16/2025)    Exercise Vital Sign     Days of Exercise per Week: Patient unable to answer     Minutes of Exercise per Session: Patient unable to answer   Stress: Patient Unable To Answer (5/16/2025)    [employer] [place] [employer] - Occupational Stress Questionnaire     Feeling of Stress : Patient unable to answer   Housing Stability: Patient Unable To Answer (5/16/2025)    Housing Stability Vital Sign     Unable to Pay for Housing in the Last Year: Patient unable to answer     Homeless in the Last Year: Patient unable to answer